# Patient Record
Sex: FEMALE | Race: WHITE | NOT HISPANIC OR LATINO | Employment: OTHER | URBAN - METROPOLITAN AREA
[De-identification: names, ages, dates, MRNs, and addresses within clinical notes are randomized per-mention and may not be internally consistent; named-entity substitution may affect disease eponyms.]

---

## 2021-02-03 DIAGNOSIS — M79.642 PAIN IN BOTH HANDS: Primary | ICD-10-CM

## 2021-02-03 DIAGNOSIS — M79.641 PAIN IN BOTH HANDS: Primary | ICD-10-CM

## 2021-02-05 ENCOUNTER — OFFICE VISIT (OUTPATIENT)
Dept: ORTHOPEDICS | Facility: CLINIC | Age: 51
End: 2021-02-05
Payer: OTHER GOVERNMENT

## 2021-02-05 ENCOUNTER — HOSPITAL ENCOUNTER (OUTPATIENT)
Dept: RADIOLOGY | Facility: HOSPITAL | Age: 51
Discharge: HOME | End: 2021-02-05
Attending: PHYSICIAN ASSISTANT
Payer: OTHER GOVERNMENT

## 2021-02-05 VITALS
WEIGHT: 130 LBS | HEIGHT: 64 IN | DIASTOLIC BLOOD PRESSURE: 85 MMHG | HEART RATE: 90 BPM | SYSTOLIC BLOOD PRESSURE: 124 MMHG | BODY MASS INDEX: 22.2 KG/M2

## 2021-02-05 DIAGNOSIS — R20.2 NUMBNESS AND TINGLING IN LEFT HAND: Primary | ICD-10-CM

## 2021-02-05 DIAGNOSIS — R20.0 NUMBNESS AND TINGLING IN LEFT HAND: Primary | ICD-10-CM

## 2021-02-05 DIAGNOSIS — M79.641 PAIN IN BOTH HANDS: ICD-10-CM

## 2021-02-05 DIAGNOSIS — M79.642 PAIN IN BOTH HANDS: ICD-10-CM

## 2021-02-05 DIAGNOSIS — M65.312 TRIGGER THUMB OF LEFT HAND: ICD-10-CM

## 2021-02-05 DIAGNOSIS — R20.0 NUMBNESS AND TINGLING IN RIGHT HAND: ICD-10-CM

## 2021-02-05 DIAGNOSIS — R20.2 NUMBNESS AND TINGLING IN RIGHT HAND: ICD-10-CM

## 2021-02-05 PROCEDURE — 99203 OFFICE O/P NEW LOW 30 MIN: CPT | Mod: 25,S$PBB,, | Performed by: PHYSICIAN ASSISTANT

## 2021-02-05 PROCEDURE — 99213 OFFICE O/P EST LOW 20 MIN: CPT | Mod: PBBFAC,25 | Performed by: PHYSICIAN ASSISTANT

## 2021-02-05 PROCEDURE — 20550 NJX 1 TENDON SHEATH/LIGAMENT: CPT | Mod: PBBFAC,LT | Performed by: PHYSICIAN ASSISTANT

## 2021-02-05 PROCEDURE — 20550 NJX 1 TENDON SHEATH/LIGAMENT: CPT | Mod: S$PBB,FA,, | Performed by: PHYSICIAN ASSISTANT

## 2021-02-05 PROCEDURE — 99999 PR PBB SHADOW E&M-EST. PATIENT-LVL III: CPT | Mod: PBBFAC,,, | Performed by: PHYSICIAN ASSISTANT

## 2021-02-05 PROCEDURE — 73130 X-RAY EXAM OF HAND: CPT | Mod: TC,50

## 2021-02-05 PROCEDURE — 73130 XR HAND COMPLETE 3 VIEWS BILATERAL: ICD-10-PCS | Mod: 26,50,, | Performed by: RADIOLOGY

## 2021-02-05 PROCEDURE — 99999 PR PBB SHADOW E&M-EST. PATIENT-LVL III: ICD-10-PCS | Mod: PBBFAC,,, | Performed by: PHYSICIAN ASSISTANT

## 2021-02-05 PROCEDURE — 99203 PR OFFICE/OUTPT VISIT, NEW, LEVL III, 30-44 MIN: ICD-10-PCS | Mod: 25,S$PBB,, | Performed by: PHYSICIAN ASSISTANT

## 2021-02-05 PROCEDURE — 20550 TENDON SHEATH: ICD-10-PCS | Mod: S$PBB,FA,, | Performed by: PHYSICIAN ASSISTANT

## 2021-02-05 PROCEDURE — 73130 X-RAY EXAM OF HAND: CPT | Mod: 26,50,, | Performed by: RADIOLOGY

## 2021-02-05 RX ORDER — METHYLPREDNISOLONE ACETATE 40 MG/ML
40 INJECTION, SUSPENSION INTRA-ARTICULAR; INTRALESIONAL; INTRAMUSCULAR; SOFT TISSUE
Status: DISCONTINUED | OUTPATIENT
Start: 2021-02-05 | End: 2021-02-05 | Stop reason: HOSPADM

## 2021-02-05 RX ADMIN — METHYLPREDNISOLONE ACETATE 40 MG: 40 INJECTION, SUSPENSION INTRALESIONAL; INTRAMUSCULAR; INTRASYNOVIAL; SOFT TISSUE at 02:02

## 2021-02-22 ENCOUNTER — HOSPITAL ENCOUNTER (EMERGENCY)
Facility: HOSPITAL | Age: 51
Discharge: HOME | End: 2021-02-22
Attending: EMERGENCY MEDICINE
Payer: OTHER GOVERNMENT

## 2021-02-22 VITALS
TEMPERATURE: 99 F | RESPIRATION RATE: 16 BRPM | SYSTOLIC BLOOD PRESSURE: 140 MMHG | DIASTOLIC BLOOD PRESSURE: 72 MMHG | OXYGEN SATURATION: 96 % | HEART RATE: 97 BPM | WEIGHT: 145.19 LBS | HEIGHT: 64 IN | BODY MASS INDEX: 24.79 KG/M2

## 2021-02-22 DIAGNOSIS — B34.9 VIRAL SYNDROME: Primary | ICD-10-CM

## 2021-02-22 DIAGNOSIS — Z20.822 COVID-19 VIRUS NOT DETECTED: ICD-10-CM

## 2021-02-22 LAB
CTP QC/QA: YES
SARS-COV-2 RDRP RESP QL NAA+PROBE: NEGATIVE

## 2021-02-22 PROCEDURE — U0002 COVID-19 LAB TEST NON-CDC: HCPCS | Performed by: REGISTERED NURSE

## 2021-02-22 PROCEDURE — 99284 EMERGENCY DEPT VISIT MOD MDM: CPT | Mod: 25

## 2021-02-22 RX ORDER — PREDNISONE 20 MG/1
40 TABLET ORAL DAILY
Qty: 10 TABLET | Refills: 0 | Status: SHIPPED | OUTPATIENT
Start: 2021-02-22 | End: 2021-02-27

## 2021-02-22 RX ORDER — BENZONATATE 100 MG/1
100 CAPSULE ORAL 3 TIMES DAILY PRN
Qty: 20 CAPSULE | Refills: 0 | Status: SHIPPED | OUTPATIENT
Start: 2021-02-22 | End: 2021-03-04

## 2021-02-22 RX ORDER — ALBUTEROL SULFATE 90 UG/1
1-2 AEROSOL, METERED RESPIRATORY (INHALATION) EVERY 6 HOURS PRN
Qty: 8 G | Refills: 0 | Status: SHIPPED | OUTPATIENT
Start: 2021-02-22 | End: 2021-04-19

## 2021-03-11 ENCOUNTER — TELEPHONE (OUTPATIENT)
Dept: ORTHOPEDICS | Facility: CLINIC | Age: 51
End: 2021-03-11

## 2021-03-11 ENCOUNTER — OFFICE VISIT (OUTPATIENT)
Dept: PHYSICAL MEDICINE AND REHAB | Facility: CLINIC | Age: 51
End: 2021-03-11
Payer: OTHER GOVERNMENT

## 2021-03-11 VITALS
HEART RATE: 83 BPM | RESPIRATION RATE: 14 BRPM | HEIGHT: 64 IN | SYSTOLIC BLOOD PRESSURE: 127 MMHG | WEIGHT: 145 LBS | BODY MASS INDEX: 24.75 KG/M2 | DIASTOLIC BLOOD PRESSURE: 86 MMHG

## 2021-03-11 DIAGNOSIS — G56.03 BILATERAL CARPAL TUNNEL SYNDROME: ICD-10-CM

## 2021-03-11 PROBLEM — K21.9 ACID REFLUX: Status: ACTIVE | Noted: 2020-01-30

## 2021-03-11 PROBLEM — Z72.0 TOBACCO ABUSE: Status: ACTIVE | Noted: 2020-01-30

## 2021-03-11 PROCEDURE — 95911 NRV CNDJ TEST 9-10 STUDIES: CPT | Mod: PBBFAC | Performed by: PHYSICAL MEDICINE & REHABILITATION

## 2021-03-11 PROCEDURE — 99999 PR PBB SHADOW E&M-EST. PATIENT-LVL III: ICD-10-PCS | Mod: PBBFAC,,, | Performed by: PHYSICAL MEDICINE & REHABILITATION

## 2021-03-11 PROCEDURE — 99999 PR PBB SHADOW E&M-EST. PATIENT-LVL III: CPT | Mod: PBBFAC,,, | Performed by: PHYSICAL MEDICINE & REHABILITATION

## 2021-03-11 PROCEDURE — 99213 OFFICE O/P EST LOW 20 MIN: CPT | Mod: PBBFAC | Performed by: PHYSICAL MEDICINE & REHABILITATION

## 2021-03-11 PROCEDURE — 99204 OFFICE O/P NEW MOD 45 MIN: CPT | Mod: 25,S$PBB,, | Performed by: PHYSICAL MEDICINE & REHABILITATION

## 2021-03-11 PROCEDURE — 99204 PR OFFICE/OUTPT VISIT, NEW, LEVL IV, 45-59 MIN: ICD-10-PCS | Mod: 25,S$PBB,, | Performed by: PHYSICAL MEDICINE & REHABILITATION

## 2021-03-11 PROCEDURE — 95911 PR NERVE CONDUCTION STUDY; 9-10 STUDIES: ICD-10-PCS | Mod: 26,S$PBB,, | Performed by: PHYSICAL MEDICINE & REHABILITATION

## 2021-03-11 PROCEDURE — 95911 NRV CNDJ TEST 9-10 STUDIES: CPT | Mod: 26,S$PBB,, | Performed by: PHYSICAL MEDICINE & REHABILITATION

## 2021-03-11 RX ORDER — POLYETHYLENE GLYCOL 3350 17 G/17G
17 POWDER, FOR SOLUTION ORAL NIGHTLY
COMMUNITY

## 2021-03-11 RX ORDER — FAMOTIDINE 20 MG/1
20 TABLET, FILM COATED ORAL
COMMUNITY
End: 2021-03-18

## 2021-03-15 ENCOUNTER — LAB VISIT (OUTPATIENT)
Dept: LAB | Facility: HOSPITAL | Age: 51
End: 2021-03-15
Attending: NURSE PRACTITIONER
Payer: OTHER GOVERNMENT

## 2021-03-15 ENCOUNTER — OFFICE VISIT (OUTPATIENT)
Dept: OBSTETRICS AND GYNECOLOGY | Facility: CLINIC | Age: 51
End: 2021-03-15
Payer: OTHER GOVERNMENT

## 2021-03-15 VITALS
WEIGHT: 145.94 LBS | SYSTOLIC BLOOD PRESSURE: 114 MMHG | BODY MASS INDEX: 25.05 KG/M2 | DIASTOLIC BLOOD PRESSURE: 78 MMHG

## 2021-03-15 DIAGNOSIS — N95.1 PERIMENOPAUSAL: ICD-10-CM

## 2021-03-15 DIAGNOSIS — N63.10 MASS OF BREAST, RIGHT: ICD-10-CM

## 2021-03-15 DIAGNOSIS — Z12.31 ENCOUNTER FOR SCREENING MAMMOGRAM FOR BREAST CANCER: Primary | ICD-10-CM

## 2021-03-15 DIAGNOSIS — N63.20 MASS OF BREAST, LEFT: ICD-10-CM

## 2021-03-15 DIAGNOSIS — Z12.72 ENCOUNTER FOR PAPANICOLAOU SMEAR OF VAGINA AS PART OF ROUTINE GYNECOLOGICAL EXAMINATION: ICD-10-CM

## 2021-03-15 DIAGNOSIS — Z00.00 PREVENTATIVE HEALTH CARE: ICD-10-CM

## 2021-03-15 DIAGNOSIS — Z01.419 ENCOUNTER FOR PAPANICOLAOU SMEAR OF VAGINA AS PART OF ROUTINE GYNECOLOGICAL EXAMINATION: ICD-10-CM

## 2021-03-15 PROCEDURE — 99999 PR PBB SHADOW E&M-EST. PATIENT-LVL III: ICD-10-PCS | Mod: PBBFAC,,, | Performed by: NURSE PRACTITIONER

## 2021-03-15 PROCEDURE — 99386 PR PREVENTIVE VISIT,NEW,40-64: ICD-10-PCS | Mod: S$PBB,,, | Performed by: NURSE PRACTITIONER

## 2021-03-15 PROCEDURE — 99213 OFFICE O/P EST LOW 20 MIN: CPT | Mod: PBBFAC | Performed by: NURSE PRACTITIONER

## 2021-03-15 PROCEDURE — 88175 CYTOPATH C/V AUTO FLUID REDO: CPT | Performed by: NURSE PRACTITIONER

## 2021-03-15 PROCEDURE — 99999 PR PBB SHADOW E&M-EST. PATIENT-LVL III: CPT | Mod: PBBFAC,,, | Performed by: NURSE PRACTITIONER

## 2021-03-15 PROCEDURE — 87624 HPV HI-RISK TYP POOLED RSLT: CPT | Performed by: NURSE PRACTITIONER

## 2021-03-15 PROCEDURE — 99386 PREV VISIT NEW AGE 40-64: CPT | Mod: S$PBB,,, | Performed by: NURSE PRACTITIONER

## 2021-03-15 PROCEDURE — 36415 COLL VENOUS BLD VENIPUNCTURE: CPT | Performed by: NURSE PRACTITIONER

## 2021-03-15 PROCEDURE — 83001 ASSAY OF GONADOTROPIN (FSH): CPT | Performed by: NURSE PRACTITIONER

## 2021-03-15 RX ORDER — IBUPROFEN 100 MG/5ML
1000 SUSPENSION, ORAL (FINAL DOSE FORM) ORAL DAILY
COMMUNITY
End: 2022-06-18 | Stop reason: ALTCHOICE

## 2021-03-16 LAB — FSH SERPL-ACNC: 12.3 MIU/ML

## 2021-03-17 ENCOUNTER — OFFICE VISIT (OUTPATIENT)
Dept: ORTHOPEDICS | Facility: CLINIC | Age: 51
End: 2021-03-17
Payer: OTHER GOVERNMENT

## 2021-03-17 VITALS
DIASTOLIC BLOOD PRESSURE: 86 MMHG | HEIGHT: 64 IN | SYSTOLIC BLOOD PRESSURE: 132 MMHG | HEART RATE: 83 BPM | WEIGHT: 145.94 LBS | BODY MASS INDEX: 24.92 KG/M2

## 2021-03-17 DIAGNOSIS — G56.03 BILATERAL CARPAL TUNNEL SYNDROME: Primary | ICD-10-CM

## 2021-03-17 DIAGNOSIS — Z01.818 PREOP TESTING: Primary | ICD-10-CM

## 2021-03-17 PROCEDURE — 99213 OFFICE O/P EST LOW 20 MIN: CPT | Mod: S$PBB,,, | Performed by: ORTHOPAEDIC SURGERY

## 2021-03-17 PROCEDURE — 99213 OFFICE O/P EST LOW 20 MIN: CPT | Mod: PBBFAC | Performed by: ORTHOPAEDIC SURGERY

## 2021-03-17 PROCEDURE — 99999 PR PBB SHADOW E&M-EST. PATIENT-LVL III: CPT | Mod: PBBFAC,,, | Performed by: ORTHOPAEDIC SURGERY

## 2021-03-17 PROCEDURE — 99999 PR PBB SHADOW E&M-EST. PATIENT-LVL III: ICD-10-PCS | Mod: PBBFAC,,, | Performed by: ORTHOPAEDIC SURGERY

## 2021-03-17 PROCEDURE — 99213 PR OFFICE/OUTPT VISIT, EST, LEVL III, 20-29 MIN: ICD-10-PCS | Mod: S$PBB,,, | Performed by: ORTHOPAEDIC SURGERY

## 2021-03-18 ENCOUNTER — HOSPITAL ENCOUNTER (OUTPATIENT)
Dept: RADIOLOGY | Facility: HOSPITAL | Age: 51
Discharge: HOME | End: 2021-03-18
Attending: NURSE PRACTITIONER
Payer: OTHER GOVERNMENT

## 2021-03-18 ENCOUNTER — OFFICE VISIT (OUTPATIENT)
Dept: FAMILY MEDICINE | Facility: CLINIC | Age: 51
End: 2021-03-18
Payer: OTHER GOVERNMENT

## 2021-03-18 VITALS
HEART RATE: 94 BPM | OXYGEN SATURATION: 96 % | DIASTOLIC BLOOD PRESSURE: 76 MMHG | SYSTOLIC BLOOD PRESSURE: 118 MMHG | TEMPERATURE: 98 F | WEIGHT: 142.75 LBS | BODY MASS INDEX: 24.37 KG/M2 | HEIGHT: 64 IN

## 2021-03-18 VITALS — HEIGHT: 64 IN | BODY MASS INDEX: 24.92 KG/M2 | WEIGHT: 145.94 LBS

## 2021-03-18 DIAGNOSIS — Z13.6 ENCOUNTER FOR SCREENING FOR CARDIOVASCULAR DISORDERS: ICD-10-CM

## 2021-03-18 DIAGNOSIS — N63.20 MASS OF BREAST, LEFT: ICD-10-CM

## 2021-03-18 DIAGNOSIS — Z01.818 PREOP EXAMINATION: ICD-10-CM

## 2021-03-18 DIAGNOSIS — R05.9 COUGH: ICD-10-CM

## 2021-03-18 DIAGNOSIS — K45.8 RECURRENT ABDOMINAL HERNIA WITHOUT OBSTRUCTION OR GANGRENE, UNSPECIFIED HERNIA TYPE: ICD-10-CM

## 2021-03-18 DIAGNOSIS — K21.9 GASTROESOPHAGEAL REFLUX DISEASE, UNSPECIFIED WHETHER ESOPHAGITIS PRESENT: Primary | ICD-10-CM

## 2021-03-18 DIAGNOSIS — N63.10 MASS OF BREAST, RIGHT: ICD-10-CM

## 2021-03-18 DIAGNOSIS — K64.4 EXTERNAL HEMORRHOID: ICD-10-CM

## 2021-03-18 PROCEDURE — 77066 DX MAMMO INCL CAD BI: CPT | Mod: 26,,, | Performed by: RADIOLOGY

## 2021-03-18 PROCEDURE — 93010 EKG 12-LEAD: ICD-10-PCS | Mod: S$PBB,,, | Performed by: INTERNAL MEDICINE

## 2021-03-18 PROCEDURE — 99999 PR PBB SHADOW E&M-EST. PATIENT-LVL V: CPT | Mod: PBBFAC,,,

## 2021-03-18 PROCEDURE — 76642 US BREAST BILATERAL LIMITED: ICD-10-PCS | Mod: 26,50,, | Performed by: RADIOLOGY

## 2021-03-18 PROCEDURE — 99203 OFFICE O/P NEW LOW 30 MIN: CPT | Mod: S$PBB,,, | Performed by: FAMILY MEDICINE

## 2021-03-18 PROCEDURE — 71046 X-RAY EXAM CHEST 2 VIEWS: CPT | Mod: 26,,, | Performed by: RADIOLOGY

## 2021-03-18 PROCEDURE — 71046 X-RAY EXAM CHEST 2 VIEWS: CPT | Mod: TC,FY,PO

## 2021-03-18 PROCEDURE — 71046 XR CHEST PA AND LATERAL: ICD-10-PCS | Mod: 26,,, | Performed by: RADIOLOGY

## 2021-03-18 PROCEDURE — 77062 MAMMO DIGITAL DIAGNOSTIC BILAT WITH TOMO: ICD-10-PCS | Mod: 26,,, | Performed by: RADIOLOGY

## 2021-03-18 PROCEDURE — 77066 MAMMO DIGITAL DIAGNOSTIC BILAT WITH TOMO: ICD-10-PCS | Mod: 26,,, | Performed by: RADIOLOGY

## 2021-03-18 PROCEDURE — 77062 BREAST TOMOSYNTHESIS BI: CPT | Mod: 26,,, | Performed by: RADIOLOGY

## 2021-03-18 PROCEDURE — 76642 ULTRASOUND BREAST LIMITED: CPT | Mod: TC,50

## 2021-03-18 PROCEDURE — 99999 PR PBB SHADOW E&M-EST. PATIENT-LVL V: ICD-10-PCS | Mod: PBBFAC,,,

## 2021-03-18 PROCEDURE — 93010 ELECTROCARDIOGRAM REPORT: CPT | Mod: S$PBB,,, | Performed by: INTERNAL MEDICINE

## 2021-03-18 PROCEDURE — 99203 PR OFFICE/OUTPT VISIT, NEW, LEVL III, 30-44 MIN: ICD-10-PCS | Mod: S$PBB,,, | Performed by: FAMILY MEDICINE

## 2021-03-18 PROCEDURE — 77066 DX MAMMO INCL CAD BI: CPT | Mod: TC

## 2021-03-18 PROCEDURE — 99215 OFFICE O/P EST HI 40 MIN: CPT | Mod: PBBFAC,25,PO | Performed by: NURSE PRACTITIONER

## 2021-03-18 PROCEDURE — 93005 ELECTROCARDIOGRAM TRACING: CPT | Mod: PBBFAC,PO | Performed by: INTERNAL MEDICINE

## 2021-03-18 PROCEDURE — 76642 ULTRASOUND BREAST LIMITED: CPT | Mod: 26,50,, | Performed by: RADIOLOGY

## 2021-03-18 RX ORDER — OMEPRAZOLE 40 MG/1
40 CAPSULE, DELAYED RELEASE ORAL DAILY
Qty: 30 CAPSULE | Refills: 3 | Status: SHIPPED | OUTPATIENT
Start: 2021-03-18 | End: 2021-07-19 | Stop reason: SDUPTHER

## 2021-03-19 LAB
HPV HR 12 DNA SPEC QL NAA+PROBE: NEGATIVE
HPV16 AG SPEC QL: NEGATIVE
HPV18 DNA SPEC QL NAA+PROBE: NEGATIVE

## 2021-03-23 LAB
FINAL PATHOLOGIC DIAGNOSIS: NORMAL
Lab: NORMAL

## 2021-03-24 ENCOUNTER — TELEPHONE (OUTPATIENT)
Dept: PREADMISSION TESTING | Facility: HOSPITAL | Age: 51
End: 2021-03-24

## 2021-03-29 ENCOUNTER — TELEPHONE (OUTPATIENT)
Dept: PREADMISSION TESTING | Facility: HOSPITAL | Age: 51
End: 2021-03-29

## 2021-04-01 ENCOUNTER — TELEPHONE (OUTPATIENT)
Dept: PREADMISSION TESTING | Facility: HOSPITAL | Age: 51
End: 2021-04-01

## 2021-04-01 ENCOUNTER — ANESTHESIA EVENT (OUTPATIENT)
Dept: SURGERY | Facility: HOSPITAL | Age: 51
End: 2021-04-01
Payer: OTHER GOVERNMENT

## 2021-04-02 ENCOUNTER — LAB VISIT (OUTPATIENT)
Dept: URGENT CARE | Facility: CLINIC | Age: 51
End: 2021-04-02
Payer: OTHER GOVERNMENT

## 2021-04-02 DIAGNOSIS — Z01.818 PREOP TESTING: ICD-10-CM

## 2021-04-02 PROCEDURE — U0003 INFECTIOUS AGENT DETECTION BY NUCLEIC ACID (DNA OR RNA); SEVERE ACUTE RESPIRATORY SYNDROME CORONAVIRUS 2 (SARS-COV-2) (CORONAVIRUS DISEASE [COVID-19]), AMPLIFIED PROBE TECHNIQUE, MAKING USE OF HIGH THROUGHPUT TECHNOLOGIES AS DESCRIBED BY CMS-2020-01-R: HCPCS | Performed by: ORTHOPAEDIC SURGERY

## 2021-04-02 PROCEDURE — U0005 INFEC AGEN DETEC AMPLI PROBE: HCPCS | Performed by: ORTHOPAEDIC SURGERY

## 2021-04-03 LAB — SARS-COV-2 RNA RESP QL NAA+PROBE: NOT DETECTED

## 2021-04-05 ENCOUNTER — HOSPITAL ENCOUNTER (OUTPATIENT)
Facility: HOSPITAL | Age: 51
Discharge: HOME | End: 2021-04-05
Attending: ORTHOPAEDIC SURGERY | Admitting: ORTHOPAEDIC SURGERY
Payer: OTHER GOVERNMENT

## 2021-04-05 ENCOUNTER — TELEPHONE (OUTPATIENT)
Dept: URGENT CARE | Facility: CLINIC | Age: 51
End: 2021-04-05

## 2021-04-05 ENCOUNTER — ANESTHESIA (OUTPATIENT)
Dept: SURGERY | Facility: HOSPITAL | Age: 51
End: 2021-04-05
Payer: OTHER GOVERNMENT

## 2021-04-05 VITALS
HEIGHT: 64 IN | OXYGEN SATURATION: 98 % | HEART RATE: 74 BPM | DIASTOLIC BLOOD PRESSURE: 68 MMHG | RESPIRATION RATE: 18 BRPM | SYSTOLIC BLOOD PRESSURE: 120 MMHG | BODY MASS INDEX: 24.62 KG/M2 | TEMPERATURE: 98 F | WEIGHT: 144.19 LBS

## 2021-04-05 DIAGNOSIS — G56.03 BILATERAL CARPAL TUNNEL SYNDROME: ICD-10-CM

## 2021-04-05 DIAGNOSIS — G56.01 CARPAL TUNNEL SYNDROME ON RIGHT: Primary | ICD-10-CM

## 2021-04-05 LAB
B-HCG UR QL: NEGATIVE
CTP QC/QA: YES

## 2021-04-05 PROCEDURE — 64721 PR REVISE MEDIAN N/CARPAL TUNNEL SURG: ICD-10-PCS | Mod: RT,,, | Performed by: ORTHOPAEDIC SURGERY

## 2021-04-05 PROCEDURE — 37000009 HC ANESTHESIA EA ADD 15 MINS: Performed by: ORTHOPAEDIC SURGERY

## 2021-04-05 PROCEDURE — D9220A PRA ANESTHESIA: ICD-10-PCS | Mod: ANES,,, | Performed by: ANESTHESIOLOGY

## 2021-04-05 PROCEDURE — 36000707: Performed by: ORTHOPAEDIC SURGERY

## 2021-04-05 PROCEDURE — 64721 CARPAL TUNNEL SURGERY: CPT | Mod: RT,,, | Performed by: ORTHOPAEDIC SURGERY

## 2021-04-05 PROCEDURE — 63600175 PHARM REV CODE 636 W HCPCS: Performed by: NURSE ANESTHETIST, CERTIFIED REGISTERED

## 2021-04-05 PROCEDURE — 37000008 HC ANESTHESIA 1ST 15 MINUTES: Performed by: ORTHOPAEDIC SURGERY

## 2021-04-05 PROCEDURE — D9220A PRA ANESTHESIA: Mod: CRNA,,, | Performed by: NURSE ANESTHETIST, CERTIFIED REGISTERED

## 2021-04-05 PROCEDURE — D9220A PRA ANESTHESIA: Mod: ANES,,, | Performed by: ANESTHESIOLOGY

## 2021-04-05 PROCEDURE — 81025 URINE PREGNANCY TEST: CPT | Performed by: ORTHOPAEDIC SURGERY

## 2021-04-05 PROCEDURE — D9220A PRA ANESTHESIA: ICD-10-PCS | Mod: CRNA,,, | Performed by: NURSE ANESTHETIST, CERTIFIED REGISTERED

## 2021-04-05 PROCEDURE — 36000706: Performed by: ORTHOPAEDIC SURGERY

## 2021-04-05 PROCEDURE — 63600175 PHARM REV CODE 636 W HCPCS: Performed by: PHYSICIAN ASSISTANT

## 2021-04-05 PROCEDURE — 25000003 PHARM REV CODE 250: Performed by: ORTHOPAEDIC SURGERY

## 2021-04-05 PROCEDURE — 71000033 HC RECOVERY, INTIAL HOUR: Performed by: ORTHOPAEDIC SURGERY

## 2021-04-05 PROCEDURE — 63600175 PHARM REV CODE 636 W HCPCS: Performed by: ANESTHESIOLOGY

## 2021-04-05 RX ORDER — DIPHENHYDRAMINE HYDROCHLORIDE 50 MG/ML
25 INJECTION INTRAMUSCULAR; INTRAVENOUS EVERY 6 HOURS PRN
Status: DISCONTINUED | OUTPATIENT
Start: 2021-04-05 | End: 2021-04-05 | Stop reason: HOSPADM

## 2021-04-05 RX ORDER — CHLORHEXIDINE GLUCONATE ORAL RINSE 1.2 MG/ML
10 SOLUTION DENTAL 2 TIMES DAILY
Status: DISCONTINUED | OUTPATIENT
Start: 2021-04-05 | End: 2021-04-05 | Stop reason: HOSPADM

## 2021-04-05 RX ORDER — LIDOCAINE HYDROCHLORIDE 20 MG/ML
INJECTION, SOLUTION INFILTRATION; PERINEURAL
Status: DISCONTINUED
Start: 2021-04-05 | End: 2021-04-05 | Stop reason: HOSPADM

## 2021-04-05 RX ORDER — MIDAZOLAM HYDROCHLORIDE 1 MG/ML
INJECTION INTRAMUSCULAR; INTRAVENOUS
Status: DISCONTINUED | OUTPATIENT
Start: 2021-04-05 | End: 2021-04-05

## 2021-04-05 RX ORDER — ONDANSETRON 2 MG/ML
4 INJECTION INTRAMUSCULAR; INTRAVENOUS ONCE AS NEEDED
Status: DISCONTINUED | OUTPATIENT
Start: 2021-04-05 | End: 2021-04-05 | Stop reason: HOSPADM

## 2021-04-05 RX ORDER — ALBUTEROL SULFATE 0.83 MG/ML
2.5 SOLUTION RESPIRATORY (INHALATION) EVERY 4 HOURS PRN
Status: DISCONTINUED | OUTPATIENT
Start: 2021-04-05 | End: 2021-04-05 | Stop reason: HOSPADM

## 2021-04-05 RX ORDER — HYDROCODONE BITARTRATE AND ACETAMINOPHEN 5; 325 MG/1; MG/1
1 TABLET ORAL EVERY 6 HOURS PRN
Qty: 15 TABLET | Refills: 0 | Status: SHIPPED | OUTPATIENT
Start: 2021-04-05 | End: 2021-04-09 | Stop reason: SDUPTHER

## 2021-04-05 RX ORDER — SODIUM CHLORIDE, SODIUM LACTATE, POTASSIUM CHLORIDE, CALCIUM CHLORIDE 600; 310; 30; 20 MG/100ML; MG/100ML; MG/100ML; MG/100ML
INJECTION, SOLUTION INTRAVENOUS CONTINUOUS
Status: ACTIVE | OUTPATIENT
Start: 2021-04-05

## 2021-04-05 RX ORDER — PROPOFOL 10 MG/ML
INJECTION, EMULSION INTRAVENOUS CONTINUOUS PRN
Status: DISCONTINUED | OUTPATIENT
Start: 2021-04-05 | End: 2021-04-05

## 2021-04-05 RX ORDER — FENTANYL CITRATE 50 UG/ML
25 INJECTION, SOLUTION INTRAMUSCULAR; INTRAVENOUS EVERY 5 MIN PRN
Status: DISCONTINUED | OUTPATIENT
Start: 2021-04-05 | End: 2021-04-05 | Stop reason: HOSPADM

## 2021-04-05 RX ORDER — FENTANYL CITRATE 50 UG/ML
INJECTION, SOLUTION INTRAMUSCULAR; INTRAVENOUS
Status: DISCONTINUED | OUTPATIENT
Start: 2021-04-05 | End: 2021-04-05

## 2021-04-05 RX ORDER — HYDROCODONE BITARTRATE AND ACETAMINOPHEN 5; 325 MG/1; MG/1
1 TABLET ORAL
Status: DISCONTINUED | OUTPATIENT
Start: 2021-04-05 | End: 2021-04-05 | Stop reason: HOSPADM

## 2021-04-05 RX ORDER — MEPERIDINE HYDROCHLORIDE 25 MG/ML
12.5 INJECTION INTRAMUSCULAR; INTRAVENOUS; SUBCUTANEOUS ONCE
Status: DISCONTINUED | OUTPATIENT
Start: 2021-04-05 | End: 2021-04-05 | Stop reason: HOSPADM

## 2021-04-05 RX ORDER — LIDOCAINE HCL/PF 100 MG/5ML
SYRINGE (ML) INTRAVENOUS
Status: DISCONTINUED | OUTPATIENT
Start: 2021-04-05 | End: 2021-04-05

## 2021-04-05 RX ORDER — CEFAZOLIN SODIUM 2 G/50ML
2 SOLUTION INTRAVENOUS
Status: COMPLETED | OUTPATIENT
Start: 2021-04-05 | End: 2021-04-05

## 2021-04-05 RX ORDER — HYDROCODONE BITARTRATE AND ACETAMINOPHEN 5; 325 MG/1; MG/1
1 TABLET ORAL EVERY 4 HOURS PRN
Status: DISCONTINUED | OUTPATIENT
Start: 2021-04-05 | End: 2021-04-05 | Stop reason: HOSPADM

## 2021-04-05 RX ORDER — SODIUM CHLORIDE, SODIUM LACTATE, POTASSIUM CHLORIDE, CALCIUM CHLORIDE 600; 310; 30; 20 MG/100ML; MG/100ML; MG/100ML; MG/100ML
INJECTION, SOLUTION INTRAVENOUS
Status: ACTIVE | OUTPATIENT
Start: 2021-04-05

## 2021-04-05 RX ORDER — LIDOCAINE HYDROCHLORIDE 20 MG/ML
INJECTION, SOLUTION EPIDURAL; INFILTRATION; INTRACAUDAL; PERINEURAL
Status: DISCONTINUED | OUTPATIENT
Start: 2021-04-05 | End: 2021-04-05 | Stop reason: HOSPADM

## 2021-04-05 RX ADMIN — Medication 100 MG: at 11:04

## 2021-04-05 RX ADMIN — CEFAZOLIN SODIUM 2 G: 2 SOLUTION INTRAVENOUS at 11:04

## 2021-04-05 RX ADMIN — SODIUM CHLORIDE, SODIUM LACTATE, POTASSIUM CHLORIDE, AND CALCIUM CHLORIDE: .6; .31; .03; .02 INJECTION, SOLUTION INTRAVENOUS at 08:04

## 2021-04-05 RX ADMIN — FENTANYL CITRATE 100 MCG: 50 INJECTION, SOLUTION INTRAMUSCULAR; INTRAVENOUS at 11:04

## 2021-04-05 RX ADMIN — MIDAZOLAM HYDROCHLORIDE 2 MG: 1 INJECTION INTRAMUSCULAR; INTRAVENOUS at 11:04

## 2021-04-05 RX ADMIN — PROPOFOL 150 MCG/KG/MIN: 10 INJECTION, EMULSION INTRAVENOUS at 11:04

## 2021-04-09 ENCOUNTER — OFFICE VISIT (OUTPATIENT)
Dept: ORTHOPEDICS | Facility: CLINIC | Age: 51
End: 2021-04-09
Payer: OTHER GOVERNMENT

## 2021-04-09 VITALS
HEIGHT: 64 IN | HEART RATE: 82 BPM | TEMPERATURE: 98 F | SYSTOLIC BLOOD PRESSURE: 122 MMHG | DIASTOLIC BLOOD PRESSURE: 84 MMHG | WEIGHT: 144 LBS | BODY MASS INDEX: 24.59 KG/M2

## 2021-04-09 DIAGNOSIS — G56.01 CARPAL TUNNEL SYNDROME OF RIGHT WRIST: Primary | ICD-10-CM

## 2021-04-09 PROCEDURE — 99999 PR PBB SHADOW E&M-EST. PATIENT-LVL III: ICD-10-PCS | Mod: PBBFAC,,, | Performed by: PHYSICIAN ASSISTANT

## 2021-04-09 PROCEDURE — 99999 PR PBB SHADOW E&M-EST. PATIENT-LVL III: CPT | Mod: PBBFAC,,, | Performed by: PHYSICIAN ASSISTANT

## 2021-04-09 PROCEDURE — 99024 POSTOP FOLLOW-UP VISIT: CPT | Mod: ,,, | Performed by: PHYSICIAN ASSISTANT

## 2021-04-09 PROCEDURE — 99213 OFFICE O/P EST LOW 20 MIN: CPT | Mod: PBBFAC | Performed by: PHYSICIAN ASSISTANT

## 2021-04-09 PROCEDURE — 99024 PR POST-OP FOLLOW-UP VISIT: ICD-10-PCS | Mod: ,,, | Performed by: PHYSICIAN ASSISTANT

## 2021-04-09 RX ORDER — HYDROCODONE BITARTRATE AND ACETAMINOPHEN 5; 325 MG/1; MG/1
1 TABLET ORAL EVERY 8 HOURS PRN
Qty: 21 TABLET | Refills: 0 | Status: SHIPPED | OUTPATIENT
Start: 2021-04-09 | End: 2021-04-19

## 2021-04-09 RX ORDER — ONDANSETRON 4 MG/1
4 TABLET, FILM COATED ORAL EVERY 8 HOURS PRN
Qty: 15 TABLET | Refills: 0 | Status: SHIPPED | OUTPATIENT
Start: 2021-04-09 | End: 2021-04-19

## 2021-04-19 ENCOUNTER — OFFICE VISIT (OUTPATIENT)
Dept: ORTHOPEDICS | Facility: CLINIC | Age: 51
End: 2021-04-19
Payer: OTHER GOVERNMENT

## 2021-04-19 VITALS
HEART RATE: 80 BPM | TEMPERATURE: 98 F | DIASTOLIC BLOOD PRESSURE: 83 MMHG | BODY MASS INDEX: 24.57 KG/M2 | HEIGHT: 64 IN | WEIGHT: 143.94 LBS | SYSTOLIC BLOOD PRESSURE: 119 MMHG

## 2021-04-19 DIAGNOSIS — G56.03 BILATERAL CARPAL TUNNEL SYNDROME: ICD-10-CM

## 2021-04-19 DIAGNOSIS — G56.01 CARPAL TUNNEL SYNDROME OF RIGHT WRIST: Primary | ICD-10-CM

## 2021-04-19 PROCEDURE — 99999 PR PBB SHADOW E&M-EST. PATIENT-LVL III: CPT | Mod: PBBFAC,,, | Performed by: PHYSICIAN ASSISTANT

## 2021-04-19 PROCEDURE — 99024 POSTOP FOLLOW-UP VISIT: CPT | Mod: ,,, | Performed by: PHYSICIAN ASSISTANT

## 2021-04-19 PROCEDURE — 99213 OFFICE O/P EST LOW 20 MIN: CPT | Mod: PBBFAC | Performed by: PHYSICIAN ASSISTANT

## 2021-04-19 PROCEDURE — 99024 PR POST-OP FOLLOW-UP VISIT: ICD-10-PCS | Mod: ,,, | Performed by: PHYSICIAN ASSISTANT

## 2021-04-19 PROCEDURE — 99999 PR PBB SHADOW E&M-EST. PATIENT-LVL III: ICD-10-PCS | Mod: PBBFAC,,, | Performed by: PHYSICIAN ASSISTANT

## 2021-05-10 ENCOUNTER — PATIENT MESSAGE (OUTPATIENT)
Dept: RESEARCH | Facility: HOSPITAL | Age: 51
End: 2021-05-10

## 2021-05-10 ENCOUNTER — OFFICE VISIT (OUTPATIENT)
Dept: SURGERY | Facility: CLINIC | Age: 51
End: 2021-05-10
Payer: OTHER GOVERNMENT

## 2021-05-10 ENCOUNTER — LAB VISIT (OUTPATIENT)
Dept: LAB | Facility: HOSPITAL | Age: 51
End: 2021-05-10
Attending: COLON & RECTAL SURGERY
Payer: OTHER GOVERNMENT

## 2021-05-10 ENCOUNTER — PATIENT MESSAGE (OUTPATIENT)
Dept: ENDOSCOPY | Facility: HOSPITAL | Age: 51
End: 2021-05-10

## 2021-05-10 VITALS
WEIGHT: 145.5 LBS | BODY MASS INDEX: 24.98 KG/M2 | SYSTOLIC BLOOD PRESSURE: 127 MMHG | HEART RATE: 81 BPM | DIASTOLIC BLOOD PRESSURE: 91 MMHG

## 2021-05-10 DIAGNOSIS — R10.12 LEFT UPPER QUADRANT ABDOMINAL PAIN: ICD-10-CM

## 2021-05-10 DIAGNOSIS — Z01.818 PRE-OP TESTING: Primary | ICD-10-CM

## 2021-05-10 DIAGNOSIS — K92.1 HEMATOCHEZIA: ICD-10-CM

## 2021-05-10 DIAGNOSIS — Z12.11 SCREEN FOR COLON CANCER: ICD-10-CM

## 2021-05-10 DIAGNOSIS — K64.4 EXTERNAL HEMORRHOID: ICD-10-CM

## 2021-05-10 DIAGNOSIS — K64.8 INTERNAL HEMORRHOID: Primary | ICD-10-CM

## 2021-05-10 DIAGNOSIS — K45.8 RECURRENT ABDOMINAL HERNIA WITHOUT OBSTRUCTION OR GANGRENE, UNSPECIFIED HERNIA TYPE: ICD-10-CM

## 2021-05-10 DIAGNOSIS — K62.5 ANAL BLEEDING: ICD-10-CM

## 2021-05-10 LAB
CREAT SERPL-MCNC: 0.7 MG/DL (ref 0.5–1.4)
EST. GFR  (AFRICAN AMERICAN): >60 ML/MIN/1.73 M^2
EST. GFR  (NON AFRICAN AMERICAN): >60 ML/MIN/1.73 M^2

## 2021-05-10 PROCEDURE — 46600 DIAGNOSTIC ANOSCOPY SPX: CPT | Mod: S$PBB,,, | Performed by: COLON & RECTAL SURGERY

## 2021-05-10 PROCEDURE — 36415 COLL VENOUS BLD VENIPUNCTURE: CPT | Performed by: COLON & RECTAL SURGERY

## 2021-05-10 PROCEDURE — 99213 OFFICE O/P EST LOW 20 MIN: CPT | Mod: PBBFAC | Performed by: COLON & RECTAL SURGERY

## 2021-05-10 PROCEDURE — 99999 PR PBB SHADOW E&M-EST. PATIENT-LVL III: CPT | Mod: PBBFAC,,, | Performed by: COLON & RECTAL SURGERY

## 2021-05-10 PROCEDURE — 46600 DIAGNOSTIC ANOSCOPY SPX: CPT | Mod: PBBFAC | Performed by: COLON & RECTAL SURGERY

## 2021-05-10 PROCEDURE — 99204 OFFICE O/P NEW MOD 45 MIN: CPT | Mod: 25,S$PBB,, | Performed by: COLON & RECTAL SURGERY

## 2021-05-10 PROCEDURE — 82565 ASSAY OF CREATININE: CPT | Performed by: COLON & RECTAL SURGERY

## 2021-05-10 PROCEDURE — 99999 PR PBB SHADOW E&M-EST. PATIENT-LVL III: ICD-10-PCS | Mod: PBBFAC,,, | Performed by: COLON & RECTAL SURGERY

## 2021-05-10 PROCEDURE — 46600 PR DIAG2STIC A2SCOPY: ICD-10-PCS | Mod: S$PBB,,, | Performed by: COLON & RECTAL SURGERY

## 2021-05-10 PROCEDURE — 99204 PR OFFICE/OUTPT VISIT, NEW, LEVL IV, 45-59 MIN: ICD-10-PCS | Mod: 25,S$PBB,, | Performed by: COLON & RECTAL SURGERY

## 2021-05-10 RX ORDER — SODIUM, POTASSIUM,MAG SULFATES 17.5-3.13G
1 SOLUTION, RECONSTITUTED, ORAL ORAL DAILY
Qty: 1 KIT | Refills: 0 | Status: SHIPPED | OUTPATIENT
Start: 2021-05-10 | End: 2021-05-12

## 2021-05-11 ENCOUNTER — OFFICE VISIT (OUTPATIENT)
Dept: ORTHOPEDICS | Facility: CLINIC | Age: 51
End: 2021-05-11
Payer: OTHER GOVERNMENT

## 2021-05-11 VITALS
SYSTOLIC BLOOD PRESSURE: 133 MMHG | HEART RATE: 69 BPM | DIASTOLIC BLOOD PRESSURE: 94 MMHG | BODY MASS INDEX: 24.75 KG/M2 | WEIGHT: 145 LBS | HEIGHT: 64 IN

## 2021-05-11 DIAGNOSIS — G56.01 CARPAL TUNNEL SYNDROME OF RIGHT WRIST: Primary | ICD-10-CM

## 2021-05-11 PROCEDURE — 99999 PR PBB SHADOW E&M-EST. PATIENT-LVL III: ICD-10-PCS | Mod: PBBFAC,,, | Performed by: ORTHOPAEDIC SURGERY

## 2021-05-11 PROCEDURE — 99999 PR PBB SHADOW E&M-EST. PATIENT-LVL III: CPT | Mod: PBBFAC,,, | Performed by: ORTHOPAEDIC SURGERY

## 2021-05-11 PROCEDURE — 99213 OFFICE O/P EST LOW 20 MIN: CPT | Mod: PBBFAC | Performed by: ORTHOPAEDIC SURGERY

## 2021-05-11 PROCEDURE — 99024 POSTOP FOLLOW-UP VISIT: CPT | Mod: ,,, | Performed by: ORTHOPAEDIC SURGERY

## 2021-05-11 PROCEDURE — 99024 PR POST-OP FOLLOW-UP VISIT: ICD-10-PCS | Mod: ,,, | Performed by: ORTHOPAEDIC SURGERY

## 2021-05-16 ENCOUNTER — LAB VISIT (OUTPATIENT)
Dept: OTOLARYNGOLOGY | Facility: CLINIC | Age: 51
End: 2021-05-16
Payer: OTHER GOVERNMENT

## 2021-05-16 DIAGNOSIS — Z01.818 PRE-OP TESTING: ICD-10-CM

## 2021-05-16 PROCEDURE — U0003 INFECTIOUS AGENT DETECTION BY NUCLEIC ACID (DNA OR RNA); SEVERE ACUTE RESPIRATORY SYNDROME CORONAVIRUS 2 (SARS-COV-2) (CORONAVIRUS DISEASE [COVID-19]), AMPLIFIED PROBE TECHNIQUE, MAKING USE OF HIGH THROUGHPUT TECHNOLOGIES AS DESCRIBED BY CMS-2020-01-R: HCPCS | Performed by: COLON & RECTAL SURGERY

## 2021-05-16 PROCEDURE — U0005 INFEC AGEN DETEC AMPLI PROBE: HCPCS | Performed by: COLON & RECTAL SURGERY

## 2021-05-17 ENCOUNTER — HOSPITAL ENCOUNTER (OUTPATIENT)
Dept: RADIOLOGY | Facility: HOSPITAL | Age: 51
Discharge: HOME | End: 2021-05-17
Attending: COLON & RECTAL SURGERY
Payer: OTHER GOVERNMENT

## 2021-05-17 DIAGNOSIS — K45.8 RECURRENT ABDOMINAL HERNIA WITHOUT OBSTRUCTION OR GANGRENE, UNSPECIFIED HERNIA TYPE: ICD-10-CM

## 2021-05-17 DIAGNOSIS — R10.12 LEFT UPPER QUADRANT ABDOMINAL PAIN: ICD-10-CM

## 2021-05-17 LAB — SARS-COV-2 RNA RESP QL NAA+PROBE: NOT DETECTED

## 2021-05-17 PROCEDURE — 74177 CT ABD & PELVIS W/CONTRAST: CPT | Mod: TC

## 2021-05-17 PROCEDURE — 25500020 PHARM REV CODE 255: Performed by: COLON & RECTAL SURGERY

## 2021-05-17 RX ADMIN — IOHEXOL 75 ML: 350 INJECTION, SOLUTION INTRAVENOUS at 02:05

## 2021-05-19 ENCOUNTER — ANESTHESIA EVENT (OUTPATIENT)
Dept: ENDOSCOPY | Facility: HOSPITAL | Age: 51
End: 2021-05-19
Payer: OTHER GOVERNMENT

## 2021-05-19 ENCOUNTER — ANESTHESIA (OUTPATIENT)
Dept: ENDOSCOPY | Facility: HOSPITAL | Age: 51
End: 2021-05-19
Payer: OTHER GOVERNMENT

## 2021-05-19 ENCOUNTER — HOSPITAL ENCOUNTER (OUTPATIENT)
Facility: HOSPITAL | Age: 51
Discharge: HOME | End: 2021-05-19
Attending: COLON & RECTAL SURGERY | Admitting: COLON & RECTAL SURGERY
Payer: OTHER GOVERNMENT

## 2021-05-19 DIAGNOSIS — Z12.11 SCREEN FOR COLON CANCER: Primary | ICD-10-CM

## 2021-05-19 LAB
B-HCG UR QL: NEGATIVE
CTP QC/QA: YES

## 2021-05-19 PROCEDURE — 45380 COLONOSCOPY AND BIOPSY: CPT | Mod: 33,,, | Performed by: COLON & RECTAL SURGERY

## 2021-05-19 PROCEDURE — 88305 TISSUE EXAM BY PATHOLOGIST: ICD-10-PCS | Mod: 26,,, | Performed by: STUDENT IN AN ORGANIZED HEALTH CARE EDUCATION/TRAINING PROGRAM

## 2021-05-19 PROCEDURE — 81025 URINE PREGNANCY TEST: CPT | Performed by: COLON & RECTAL SURGERY

## 2021-05-19 PROCEDURE — 37000008 HC ANESTHESIA 1ST 15 MINUTES: Performed by: COLON & RECTAL SURGERY

## 2021-05-19 PROCEDURE — 45380 COLONOSCOPY AND BIOPSY: CPT | Performed by: COLON & RECTAL SURGERY

## 2021-05-19 PROCEDURE — 88305 TISSUE EXAM BY PATHOLOGIST: CPT | Performed by: STUDENT IN AN ORGANIZED HEALTH CARE EDUCATION/TRAINING PROGRAM

## 2021-05-19 PROCEDURE — 27201012 HC FORCEPS, HOT/COLD, DISP: Performed by: COLON & RECTAL SURGERY

## 2021-05-19 PROCEDURE — 88305 TISSUE EXAM BY PATHOLOGIST: CPT | Mod: 26,,, | Performed by: STUDENT IN AN ORGANIZED HEALTH CARE EDUCATION/TRAINING PROGRAM

## 2021-05-19 PROCEDURE — 25000003 PHARM REV CODE 250: Performed by: NURSE ANESTHETIST, CERTIFIED REGISTERED

## 2021-05-19 PROCEDURE — 63600175 PHARM REV CODE 636 W HCPCS: Performed by: NURSE ANESTHETIST, CERTIFIED REGISTERED

## 2021-05-19 PROCEDURE — 37000009 HC ANESTHESIA EA ADD 15 MINS: Performed by: COLON & RECTAL SURGERY

## 2021-05-19 PROCEDURE — 45380 PR COLONOSCOPY,BIOPSY: ICD-10-PCS | Mod: 33,,, | Performed by: COLON & RECTAL SURGERY

## 2021-05-19 RX ORDER — LIDOCAINE HYDROCHLORIDE 10 MG/ML
INJECTION, SOLUTION EPIDURAL; INFILTRATION; INTRACAUDAL; PERINEURAL
Status: DISCONTINUED | OUTPATIENT
Start: 2021-05-19 | End: 2021-05-19

## 2021-05-19 RX ORDER — SODIUM CHLORIDE, SODIUM LACTATE, POTASSIUM CHLORIDE, CALCIUM CHLORIDE 600; 310; 30; 20 MG/100ML; MG/100ML; MG/100ML; MG/100ML
INJECTION, SOLUTION INTRAVENOUS CONTINUOUS PRN
Status: DISCONTINUED | OUTPATIENT
Start: 2021-05-19 | End: 2021-05-19

## 2021-05-19 RX ORDER — ONDANSETRON 2 MG/ML
INJECTION INTRAMUSCULAR; INTRAVENOUS
Status: DISCONTINUED | OUTPATIENT
Start: 2021-05-19 | End: 2021-05-19

## 2021-05-19 RX ORDER — PROPOFOL 10 MG/ML
VIAL (ML) INTRAVENOUS
Status: DISCONTINUED | OUTPATIENT
Start: 2021-05-19 | End: 2021-05-19

## 2021-05-19 RX ADMIN — PROPOFOL 30 MG: 10 INJECTION, EMULSION INTRAVENOUS at 11:05

## 2021-05-19 RX ADMIN — LIDOCAINE HYDROCHLORIDE 50 MG: 10 INJECTION, SOLUTION EPIDURAL; INFILTRATION; INTRACAUDAL; PERINEURAL at 11:05

## 2021-05-19 RX ADMIN — PROPOFOL 40 MG: 10 INJECTION, EMULSION INTRAVENOUS at 11:05

## 2021-05-19 RX ADMIN — PROPOFOL 100 MG: 10 INJECTION, EMULSION INTRAVENOUS at 11:05

## 2021-05-19 RX ADMIN — ONDANSETRON 4 MG: 2 INJECTION, SOLUTION INTRAMUSCULAR; INTRAVENOUS at 11:05

## 2021-05-19 RX ADMIN — SODIUM CHLORIDE, SODIUM LACTATE, POTASSIUM CHLORIDE, AND CALCIUM CHLORIDE: .6; .31; .03; .02 INJECTION, SOLUTION INTRAVENOUS at 11:05

## 2021-05-20 VITALS
TEMPERATURE: 99 F | WEIGHT: 138.88 LBS | SYSTOLIC BLOOD PRESSURE: 122 MMHG | HEART RATE: 78 BPM | HEIGHT: 64 IN | RESPIRATION RATE: 18 BRPM | OXYGEN SATURATION: 98 % | DIASTOLIC BLOOD PRESSURE: 78 MMHG | BODY MASS INDEX: 23.71 KG/M2

## 2021-05-24 LAB
FINAL PATHOLOGIC DIAGNOSIS: NORMAL
GROSS: NORMAL
Lab: NORMAL

## 2021-07-19 DIAGNOSIS — K21.9 GASTROESOPHAGEAL REFLUX DISEASE, UNSPECIFIED WHETHER ESOPHAGITIS PRESENT: Primary | ICD-10-CM

## 2021-07-19 RX ORDER — OMEPRAZOLE 40 MG/1
40 CAPSULE, DELAYED RELEASE ORAL DAILY
Qty: 30 CAPSULE | Refills: 6 | Status: SHIPPED | OUTPATIENT
Start: 2021-07-19 | End: 2022-02-16 | Stop reason: SDUPTHER

## 2021-07-28 ENCOUNTER — OFFICE VISIT (OUTPATIENT)
Dept: ORTHOPEDICS | Facility: CLINIC | Age: 51
End: 2021-07-28
Payer: OTHER GOVERNMENT

## 2021-07-28 VITALS
WEIGHT: 138.88 LBS | BODY MASS INDEX: 23.71 KG/M2 | SYSTOLIC BLOOD PRESSURE: 121 MMHG | HEIGHT: 64 IN | DIASTOLIC BLOOD PRESSURE: 86 MMHG | HEART RATE: 79 BPM

## 2021-07-28 DIAGNOSIS — M65.30 TRIGGER FINGER, ACQUIRED: Primary | ICD-10-CM

## 2021-07-28 DIAGNOSIS — G56.01 CARPAL TUNNEL SYNDROME OF RIGHT WRIST: ICD-10-CM

## 2021-07-28 PROCEDURE — 20526 THER INJECTION CARP TUNNEL: CPT | Mod: PBBFAC,RT | Performed by: ORTHOPAEDIC SURGERY

## 2021-07-28 PROCEDURE — 99213 PR OFFICE/OUTPT VISIT, EST, LEVL III, 20-29 MIN: ICD-10-PCS | Mod: S$PBB,25,, | Performed by: ORTHOPAEDIC SURGERY

## 2021-07-28 PROCEDURE — 99213 OFFICE O/P EST LOW 20 MIN: CPT | Mod: PBBFAC,25 | Performed by: ORTHOPAEDIC SURGERY

## 2021-07-28 PROCEDURE — 20550 TENDON SHEATH: ICD-10-PCS | Mod: S$PBB,51,LT, | Performed by: ORTHOPAEDIC SURGERY

## 2021-07-28 PROCEDURE — 99213 OFFICE O/P EST LOW 20 MIN: CPT | Mod: S$PBB,25,, | Performed by: ORTHOPAEDIC SURGERY

## 2021-07-28 PROCEDURE — 99999 PR PBB SHADOW E&M-EST. PATIENT-LVL III: CPT | Mod: PBBFAC,,, | Performed by: ORTHOPAEDIC SURGERY

## 2021-07-28 PROCEDURE — 20550 NJX 1 TENDON SHEATH/LIGAMENT: CPT | Mod: PBBFAC,LT | Performed by: ORTHOPAEDIC SURGERY

## 2021-07-28 PROCEDURE — 20526 CARPAL TUNNEL: ICD-10-PCS | Mod: S$PBB,59,RT, | Performed by: ORTHOPAEDIC SURGERY

## 2021-07-28 PROCEDURE — 99999 PR PBB SHADOW E&M-EST. PATIENT-LVL III: ICD-10-PCS | Mod: PBBFAC,,, | Performed by: ORTHOPAEDIC SURGERY

## 2021-07-28 RX ORDER — TRIAMCINOLONE ACETONIDE 40 MG/ML
40 INJECTION, SUSPENSION INTRA-ARTICULAR; INTRAMUSCULAR
Status: DISCONTINUED | OUTPATIENT
Start: 2021-07-28 | End: 2021-07-28 | Stop reason: HOSPADM

## 2021-07-28 RX ADMIN — TRIAMCINOLONE ACETONIDE 40 MG: 200 INJECTION, SUSPENSION INTRA-ARTICULAR; INTRAMUSCULAR at 04:07

## 2021-08-25 DIAGNOSIS — Z11.59 NEED FOR HEPATITIS C SCREENING TEST: ICD-10-CM

## 2021-11-23 ENCOUNTER — OFFICE VISIT (OUTPATIENT)
Dept: FAMILY MEDICINE | Facility: CLINIC | Age: 51
End: 2021-11-23
Attending: FAMILY MEDICINE
Payer: OTHER GOVERNMENT

## 2021-11-23 ENCOUNTER — HOSPITAL ENCOUNTER (OUTPATIENT)
Dept: RADIOLOGY | Facility: HOSPITAL | Age: 51
Discharge: HOME | End: 2021-11-23
Attending: FAMILY MEDICINE
Payer: OTHER GOVERNMENT

## 2021-11-23 VITALS
HEART RATE: 91 BPM | WEIGHT: 151.88 LBS | HEIGHT: 64 IN | TEMPERATURE: 98 F | SYSTOLIC BLOOD PRESSURE: 130 MMHG | BODY MASS INDEX: 25.93 KG/M2 | DIASTOLIC BLOOD PRESSURE: 88 MMHG | OXYGEN SATURATION: 98 %

## 2021-11-23 DIAGNOSIS — R07.81 RIB PAIN: ICD-10-CM

## 2021-11-23 DIAGNOSIS — R07.81 RIB PAIN: Primary | ICD-10-CM

## 2021-11-23 DIAGNOSIS — G89.29 CHRONIC MIDLINE LOW BACK PAIN WITHOUT SCIATICA: ICD-10-CM

## 2021-11-23 DIAGNOSIS — M54.50 CHRONIC MIDLINE LOW BACK PAIN WITHOUT SCIATICA: ICD-10-CM

## 2021-11-23 PROCEDURE — 99999 PR PBB SHADOW E&M-EST. PATIENT-LVL IV: CPT | Mod: PBBFAC,,, | Performed by: FAMILY MEDICINE

## 2021-11-23 PROCEDURE — 72100 X-RAY EXAM L-S SPINE 2/3 VWS: CPT | Mod: 26,,, | Performed by: RADIOLOGY

## 2021-11-23 PROCEDURE — 99214 PR OFFICE/OUTPT VISIT, EST, LEVL IV, 30-39 MIN: ICD-10-PCS | Mod: S$PBB,,, | Performed by: FAMILY MEDICINE

## 2021-11-23 PROCEDURE — 71110 X-RAY EXAM RIBS BIL 3 VIEWS: CPT | Mod: 26,,, | Performed by: RADIOLOGY

## 2021-11-23 PROCEDURE — 72100 XR LUMBAR SPINE AP AND LATERAL: ICD-10-PCS | Mod: 26,,, | Performed by: RADIOLOGY

## 2021-11-23 PROCEDURE — 99214 OFFICE O/P EST MOD 30 MIN: CPT | Mod: PBBFAC,PO | Performed by: FAMILY MEDICINE

## 2021-11-23 PROCEDURE — 71110 X-RAY EXAM RIBS BIL 3 VIEWS: CPT | Mod: TC,PO

## 2021-11-23 PROCEDURE — 72100 X-RAY EXAM L-S SPINE 2/3 VWS: CPT | Mod: TC,PO

## 2021-11-23 PROCEDURE — 99214 OFFICE O/P EST MOD 30 MIN: CPT | Mod: S$PBB,,, | Performed by: FAMILY MEDICINE

## 2021-11-23 PROCEDURE — 99999 PR PBB SHADOW E&M-EST. PATIENT-LVL IV: ICD-10-PCS | Mod: PBBFAC,,, | Performed by: FAMILY MEDICINE

## 2021-11-23 PROCEDURE — 71110 XR RIBS 3 VIEWS BILATERAL: ICD-10-PCS | Mod: 26,,, | Performed by: RADIOLOGY

## 2021-11-23 RX ORDER — DICLOFENAC SODIUM 75 MG/1
75 TABLET, DELAYED RELEASE ORAL 2 TIMES DAILY PRN
Qty: 20 TABLET | Refills: 0 | Status: SHIPPED | OUTPATIENT
Start: 2021-11-23 | End: 2022-06-18 | Stop reason: ALTCHOICE

## 2022-02-03 ENCOUNTER — TELEPHONE (OUTPATIENT)
Dept: PAIN MEDICINE | Facility: CLINIC | Age: 52
End: 2022-02-03
Payer: OTHER GOVERNMENT

## 2022-02-03 ENCOUNTER — TELEPHONE (OUTPATIENT)
Dept: PHYSICAL MEDICINE AND REHAB | Facility: CLINIC | Age: 52
End: 2022-02-03
Payer: OTHER GOVERNMENT

## 2022-02-03 ENCOUNTER — TELEPHONE (OUTPATIENT)
Dept: FAMILY MEDICINE | Facility: CLINIC | Age: 52
End: 2022-02-03
Payer: OTHER GOVERNMENT

## 2022-02-03 DIAGNOSIS — M51.36 DDD (DEGENERATIVE DISC DISEASE), LUMBAR: Primary | ICD-10-CM

## 2022-02-03 NOTE — TELEPHONE ENCOUNTER
Returned patient's phone call. Patient had questions about making appt with [facility].  [facility].  Patient to f/u with PCP per chart message from Dr. Contreras.    Keyona Weber RN

## 2022-02-03 NOTE — TELEPHONE ENCOUNTER
----- Message from Deonte Starkey sent at 2/3/2022  2:40 PM CST -----  Contact: BOO VILLEGAS [[id number]]  .Type:  Patient Requesting Referral    Who Called:BOO VILLEGAS [[id number]]  Does the patient already have the specialty appointment scheduled?: no   If yes, what is the date of that appointment?: no   Referral to What Specialty: back  and spine   Reason for Referral:  pt back    Does the patient want the referral with a specific physician?:  Is the [facility]  Patient Requesting a Response?:  yes   Would the patient rather a call back or a response via [facility]?  Call   Best Call Back Number: [phone]   Additional Information:

## 2022-02-07 ENCOUNTER — TELEPHONE (OUTPATIENT)
Dept: PAIN MEDICINE | Facility: CLINIC | Age: 52
End: 2022-02-07
Payer: OTHER GOVERNMENT

## 2022-02-07 NOTE — TELEPHONE ENCOUNTER
Please place internal referral for [facility] and Spine clinic.   I will contact pre-service department after referral is in.

## 2022-02-11 ENCOUNTER — TELEPHONE (OUTPATIENT)
Dept: ADMINISTRATIVE | Facility: OTHER | Age: 52
End: 2022-02-11
Payer: OTHER GOVERNMENT

## 2022-02-11 ENCOUNTER — PATIENT OUTREACH (OUTPATIENT)
Dept: ADMINISTRATIVE | Facility: OTHER | Age: 52
End: 2022-02-11
Payer: OTHER GOVERNMENT

## 2022-02-11 NOTE — PROGRESS NOTES
Health Maintenance Due   Topic Date Due    Hepatitis C Screening  Never done    COVID-19 Vaccine (1) Never done    Pneumococcal Vaccines (Age 0-64) (1 of 2 - PPSV23) Never done    HIV Screening  Never done    Influenza Vaccine (1) Never done    Mammogram  03/18/2022     Updates were requested from care everywhere.  Chart was reviewed for overdue [facility] Encounters ([facility]) topics (CRS, Breast Cancer Screening, Eye exam)  Health Maintenance has been updated.  LINKS immunization registry triggered.  Immunizations were reconciled.

## 2022-02-14 ENCOUNTER — OFFICE VISIT (OUTPATIENT)
Dept: PAIN MEDICINE | Facility: CLINIC | Age: 52
End: 2022-02-14
Payer: OTHER GOVERNMENT

## 2022-02-14 ENCOUNTER — HOSPITAL ENCOUNTER (OUTPATIENT)
Dept: RADIOLOGY | Facility: HOSPITAL | Age: 52
Discharge: HOME | End: 2022-02-14
Attending: PHYSICAL MEDICINE & REHABILITATION
Payer: OTHER GOVERNMENT

## 2022-02-14 VITALS
BODY MASS INDEX: 26.57 KG/M2 | HEIGHT: 64 IN | SYSTOLIC BLOOD PRESSURE: 129 MMHG | HEART RATE: 87 BPM | DIASTOLIC BLOOD PRESSURE: 91 MMHG | WEIGHT: 155.63 LBS

## 2022-02-14 DIAGNOSIS — M43.16 SPONDYLOLISTHESIS OF LUMBAR REGION: Primary | ICD-10-CM

## 2022-02-14 DIAGNOSIS — M54.2 NECK PAIN: ICD-10-CM

## 2022-02-14 DIAGNOSIS — M51.36 DDD (DEGENERATIVE DISC DISEASE), LUMBAR: ICD-10-CM

## 2022-02-14 PROCEDURE — 99215 OFFICE O/P EST HI 40 MIN: CPT | Mod: PBBFAC | Performed by: PHYSICAL MEDICINE & REHABILITATION

## 2022-02-14 PROCEDURE — 72052 X-RAY EXAM NECK SPINE 6/>VWS: CPT | Mod: 26,,, | Performed by: RADIOLOGY

## 2022-02-14 PROCEDURE — 99999 PR PBB SHADOW E&M-EST. PATIENT-LVL V: ICD-10-PCS | Mod: PBBFAC,,, | Performed by: PHYSICAL MEDICINE & REHABILITATION

## 2022-02-14 PROCEDURE — 99999 PR PBB SHADOW E&M-EST. PATIENT-LVL V: CPT | Mod: PBBFAC,,, | Performed by: PHYSICAL MEDICINE & REHABILITATION

## 2022-02-14 PROCEDURE — 72052 X-RAY EXAM NECK SPINE 6/>VWS: CPT | Mod: TC

## 2022-02-14 PROCEDURE — 99204 OFFICE O/P NEW MOD 45 MIN: CPT | Mod: S$PBB,,, | Performed by: PHYSICAL MEDICINE & REHABILITATION

## 2022-02-14 PROCEDURE — 99204 PR OFFICE/OUTPT VISIT, NEW, LEVL IV, 45-59 MIN: ICD-10-PCS | Mod: S$PBB,,, | Performed by: PHYSICAL MEDICINE & REHABILITATION

## 2022-02-14 PROCEDURE — 72052 XR CERVICAL SPINE 5 VIEW WITH FLEX AND EXT: ICD-10-PCS | Mod: 26,,, | Performed by: RADIOLOGY

## 2022-02-14 RX ORDER — CYCLOBENZAPRINE HCL 10 MG
TABLET ORAL
Qty: 60 TABLET | Refills: 1 | Status: SHIPPED | OUTPATIENT
Start: 2022-02-14 | End: 2022-04-06

## 2022-02-14 RX ORDER — GABAPENTIN 300 MG/1
300 CAPSULE ORAL 2 TIMES DAILY PRN
Qty: 60 CAPSULE | Refills: 11 | Status: SHIPPED | OUTPATIENT
Start: 2022-02-14 | End: 2023-02-14
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
## 2022-02-15 ENCOUNTER — TELEPHONE (OUTPATIENT)
Dept: NEUROSURGERY | Facility: CLINIC | Age: 52
End: 2022-02-15
Payer: OTHER GOVERNMENT

## 2022-02-15 ENCOUNTER — TELEPHONE (OUTPATIENT)
Dept: PAIN MEDICINE | Facility: CLINIC | Age: 52
End: 2022-02-15
Payer: OTHER GOVERNMENT

## 2022-02-15 NOTE — TELEPHONE ENCOUNTER
----- Message from Lisa Horowitz sent at 2/15/2022 10:46 AM CST -----  Contact: Rosario Ponce would like a call back in regards to Raina Weber calling but missing her call. Please call her or have Raina call the patient at [phone]

## 2022-02-17 ENCOUNTER — OFFICE VISIT (OUTPATIENT)
Dept: NEUROSURGERY | Facility: CLINIC | Age: 52
End: 2022-02-17
Payer: OTHER GOVERNMENT

## 2022-02-17 ENCOUNTER — HOSPITAL ENCOUNTER (OUTPATIENT)
Dept: RADIOLOGY | Facility: HOSPITAL | Age: 52
Discharge: HOME | End: 2022-02-17
Attending: PHYSICIAN ASSISTANT
Payer: OTHER GOVERNMENT

## 2022-02-17 VITALS
HEART RATE: 91 BPM | WEIGHT: 155 LBS | BODY MASS INDEX: 26.46 KG/M2 | SYSTOLIC BLOOD PRESSURE: 122 MMHG | DIASTOLIC BLOOD PRESSURE: 86 MMHG | HEIGHT: 64 IN

## 2022-02-17 DIAGNOSIS — M50.30 DDD (DEGENERATIVE DISC DISEASE), CERVICAL: ICD-10-CM

## 2022-02-17 DIAGNOSIS — M48.07 SPINAL STENOSIS, LUMBOSACRAL REGION: ICD-10-CM

## 2022-02-17 DIAGNOSIS — M43.16 SPONDYLOLISTHESIS OF LUMBAR REGION: Primary | ICD-10-CM

## 2022-02-17 DIAGNOSIS — M54.2 CERVICALGIA: ICD-10-CM

## 2022-02-17 DIAGNOSIS — M43.16 SPONDYLOLISTHESIS OF LUMBAR REGION: ICD-10-CM

## 2022-02-17 PROCEDURE — 99204 PR OFFICE/OUTPT VISIT, NEW, LEVL IV, 45-59 MIN: ICD-10-PCS | Mod: S$PBB,,, | Performed by: PHYSICIAN ASSISTANT

## 2022-02-17 PROCEDURE — 99204 OFFICE O/P NEW MOD 45 MIN: CPT | Mod: S$PBB,,, | Performed by: PHYSICIAN ASSISTANT

## 2022-02-17 PROCEDURE — 72082 X-RAY EXAM ENTIRE SPI 2/3 VW: CPT | Mod: 26,,, | Performed by: RADIOLOGY

## 2022-02-17 PROCEDURE — 72082 XR SPINE SURVEY AP AND LATERAL: ICD-10-PCS | Mod: 26,,, | Performed by: RADIOLOGY

## 2022-02-17 PROCEDURE — 99999 PR PBB SHADOW E&M-EST. PATIENT-LVL V: ICD-10-PCS | Mod: PBBFAC,,, | Performed by: PHYSICIAN ASSISTANT

## 2022-02-17 PROCEDURE — 99999 PR PBB SHADOW E&M-EST. PATIENT-LVL V: CPT | Mod: PBBFAC,,, | Performed by: PHYSICIAN ASSISTANT

## 2022-02-17 PROCEDURE — 72082 X-RAY EXAM ENTIRE SPI 2/3 VW: CPT | Mod: TC

## 2022-02-17 PROCEDURE — 99215 OFFICE O/P EST HI 40 MIN: CPT | Mod: PBBFAC,PO | Performed by: PHYSICIAN ASSISTANT
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
## 2022-03-04 ENCOUNTER — HOSPITAL ENCOUNTER (OUTPATIENT)
Dept: RADIOLOGY | Facility: HOSPITAL | Age: 52
Discharge: HOME | End: 2022-03-04
Attending: PHYSICIAN ASSISTANT
Payer: OTHER GOVERNMENT

## 2022-03-04 DIAGNOSIS — M48.07 SPINAL STENOSIS, LUMBOSACRAL REGION: ICD-10-CM

## 2022-03-04 DIAGNOSIS — M54.2 CERVICALGIA: ICD-10-CM

## 2022-03-04 PROCEDURE — 72148 MRI LUMBAR SPINE WITHOUT CONTRAST: ICD-10-PCS | Mod: 26,,, | Performed by: RADIOLOGY

## 2022-03-04 PROCEDURE — 72141 MRI NECK SPINE W/O DYE: CPT | Mod: 26,,, | Performed by: RADIOLOGY

## 2022-03-04 PROCEDURE — 72148 MRI LUMBAR SPINE W/O DYE: CPT | Mod: 26,,, | Performed by: RADIOLOGY

## 2022-03-04 PROCEDURE — 72141 MRI CERVICAL SPINE WITHOUT CONTRAST: ICD-10-PCS | Mod: 26,,, | Performed by: RADIOLOGY

## 2022-03-04 PROCEDURE — 72141 MRI NECK SPINE W/O DYE: CPT | Mod: TC

## 2022-03-04 PROCEDURE — 72148 MRI LUMBAR SPINE W/O DYE: CPT | Mod: TC

## 2022-03-08 ENCOUNTER — OFFICE VISIT (OUTPATIENT)
Dept: NEUROSURGERY | Facility: CLINIC | Age: 52
End: 2022-03-08
Payer: OTHER GOVERNMENT

## 2022-03-08 VITALS
WEIGHT: 155 LBS | SYSTOLIC BLOOD PRESSURE: 135 MMHG | HEART RATE: 83 BPM | RESPIRATION RATE: 16 BRPM | BODY MASS INDEX: 26.46 KG/M2 | DIASTOLIC BLOOD PRESSURE: 90 MMHG | HEIGHT: 64 IN

## 2022-03-08 DIAGNOSIS — M43.16 SPONDYLOLISTHESIS OF LUMBAR REGION: Primary | ICD-10-CM

## 2022-03-08 DIAGNOSIS — M51.36 DDD (DEGENERATIVE DISC DISEASE), LUMBAR: ICD-10-CM

## 2022-03-08 DIAGNOSIS — M54.9 DORSALGIA, UNSPECIFIED: ICD-10-CM

## 2022-03-08 PROCEDURE — 99213 OFFICE O/P EST LOW 20 MIN: CPT | Mod: S$PBB,,, | Performed by: PHYSICIAN ASSISTANT

## 2022-03-08 PROCEDURE — 99999 PR PBB SHADOW E&M-EST. PATIENT-LVL IV: CPT | Mod: PBBFAC,,, | Performed by: PHYSICIAN ASSISTANT

## 2022-03-08 PROCEDURE — 99214 OFFICE O/P EST MOD 30 MIN: CPT | Mod: PBBFAC,PO | Performed by: PHYSICIAN ASSISTANT

## 2022-03-08 PROCEDURE — 99213 PR OFFICE/OUTPT VISIT, EST, LEVL III, 20-29 MIN: ICD-10-PCS | Mod: S$PBB,,, | Performed by: PHYSICIAN ASSISTANT

## 2022-03-08 PROCEDURE — 99999 PR PBB SHADOW E&M-EST. PATIENT-LVL IV: ICD-10-PCS | Mod: PBBFAC,,, | Performed by: PHYSICIAN ASSISTANT
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
## 2022-03-18 ENCOUNTER — HOSPITAL ENCOUNTER (OUTPATIENT)
Dept: RADIOLOGY | Facility: HOSPITAL | Age: 52
Discharge: HOME | End: 2022-03-18
Attending: PHYSICIAN ASSISTANT
Payer: OTHER GOVERNMENT

## 2022-03-18 DIAGNOSIS — M54.9 DORSALGIA, UNSPECIFIED: ICD-10-CM

## 2022-03-18 PROCEDURE — 72131 CT LUMBAR SPINE WITHOUT CONTRAST: ICD-10-PCS | Mod: 26,,, | Performed by: RADIOLOGY

## 2022-03-18 PROCEDURE — 72131 CT LUMBAR SPINE W/O DYE: CPT | Mod: 26,,, | Performed by: RADIOLOGY

## 2022-03-18 PROCEDURE — 72131 CT LUMBAR SPINE W/O DYE: CPT | Mod: TC

## 2022-03-23 ENCOUNTER — PATIENT OUTREACH (OUTPATIENT)
Dept: ADMINISTRATIVE | Facility: OTHER | Age: 52
End: 2022-03-23
Payer: OTHER GOVERNMENT

## 2022-03-24 ENCOUNTER — TELEPHONE (OUTPATIENT)
Dept: NEUROSURGERY | Facility: CLINIC | Age: 52
End: 2022-03-24
Payer: OTHER GOVERNMENT

## 2022-03-24 ENCOUNTER — LAB VISIT (OUTPATIENT)
Dept: LAB | Facility: HOSPITAL | Age: 52
End: 2022-03-24
Attending: NEUROLOGICAL SURGERY
Payer: OTHER GOVERNMENT

## 2022-03-24 ENCOUNTER — TELEPHONE (OUTPATIENT)
Dept: PRIMARY CARE CLINIC | Facility: CLINIC | Age: 52
End: 2022-03-24
Payer: OTHER GOVERNMENT

## 2022-03-24 ENCOUNTER — OFFICE VISIT (OUTPATIENT)
Dept: NEUROSURGERY | Facility: CLINIC | Age: 52
End: 2022-03-24
Payer: OTHER GOVERNMENT

## 2022-03-24 ENCOUNTER — HOSPITAL ENCOUNTER (OUTPATIENT)
Dept: CARDIOLOGY | Facility: HOSPITAL | Age: 52
Discharge: HOME | End: 2022-03-24
Attending: NEUROLOGICAL SURGERY
Payer: OTHER GOVERNMENT

## 2022-03-24 VITALS
BODY MASS INDEX: 26.46 KG/M2 | WEIGHT: 155 LBS | HEIGHT: 64 IN | DIASTOLIC BLOOD PRESSURE: 82 MMHG | SYSTOLIC BLOOD PRESSURE: 118 MMHG | RESPIRATION RATE: 18 BRPM | HEART RATE: 89 BPM

## 2022-03-24 DIAGNOSIS — M51.36 DEGENERATIVE DISC DISEASE, LUMBAR: ICD-10-CM

## 2022-03-24 DIAGNOSIS — D69.9 BLEEDING DISORDER: ICD-10-CM

## 2022-03-24 DIAGNOSIS — M48.062 LUMBAR STENOSIS WITH NEUROGENIC CLAUDICATION: ICD-10-CM

## 2022-03-24 DIAGNOSIS — M54.16 LUMBAR RADICULOPATHY: ICD-10-CM

## 2022-03-24 DIAGNOSIS — Z01.818 PRE-OP TESTING: ICD-10-CM

## 2022-03-24 DIAGNOSIS — M50.30 DEGENERATIVE DISC DISEASE, CERVICAL: Primary | ICD-10-CM

## 2022-03-24 DIAGNOSIS — M43.16 SPONDYLOLISTHESIS, LUMBAR REGION: ICD-10-CM

## 2022-03-24 DIAGNOSIS — Z01.818 PRE-OP TESTING: Primary | ICD-10-CM

## 2022-03-24 LAB
ALBUMIN SERPL BCP-MCNC: 3.6 G/DL (ref 3.5–5.2)
ALP SERPL-CCNC: 60 U/L (ref 55–135)
ALT SERPL W/O P-5'-P-CCNC: 14 U/L (ref 10–44)
ANION GAP SERPL CALC-SCNC: 9 MMOL/L (ref 8–16)
APTT BLDCRRT: 29 SEC (ref 21–32)
AST SERPL-CCNC: 20 U/L (ref 10–40)
BASOPHILS # BLD AUTO: 0.05 K/UL (ref 0–0.2)
BASOPHILS NFR BLD: 0.5 % (ref 0–1.9)
BILIRUB SERPL-MCNC: 0.2 MG/DL (ref 0.1–1)
BUN SERPL-MCNC: 12 MG/DL (ref 6–20)
CALCIUM SERPL-MCNC: 9.8 MG/DL (ref 8.7–10.5)
CHLORIDE SERPL-SCNC: 103 MMOL/L (ref 95–110)
CO2 SERPL-SCNC: 26 MMOL/L (ref 23–29)
CREAT SERPL-MCNC: 0.6 MG/DL (ref 0.5–1.4)
DIFFERENTIAL METHOD: ABNORMAL
EOSINOPHIL # BLD AUTO: 0.1 K/UL (ref 0–0.5)
EOSINOPHIL NFR BLD: 0.6 % (ref 0–8)
ERYTHROCYTE [DISTWIDTH] IN BLOOD BY AUTOMATED COUNT: 12.5 % (ref 11.5–14.5)
EST. GFR  (AFRICAN AMERICAN): >60 ML/MIN/1.73 M^2
EST. GFR  (NON AFRICAN AMERICAN): >60 ML/MIN/1.73 M^2
GLUCOSE SERPL-MCNC: 78 MG/DL (ref 70–110)
HCT VFR BLD AUTO: 38.2 % (ref 37–48.5)
HGB BLD-MCNC: 12.4 G/DL (ref 12–16)
IMM GRANULOCYTES # BLD AUTO: 0.04 K/UL (ref 0–0.04)
IMM GRANULOCYTES NFR BLD AUTO: 0.4 % (ref 0–0.5)
INR PPP: 1 (ref 0.8–1.2)
LYMPHOCYTES # BLD AUTO: 2.2 K/UL (ref 1–4.8)
LYMPHOCYTES NFR BLD: 21.4 % (ref 18–48)
MCH RBC QN AUTO: 31.1 PG (ref 27–31)
MCHC RBC AUTO-ENTMCNC: 32.5 G/DL (ref 32–36)
MCV RBC AUTO: 96 FL (ref 82–98)
MONOCYTES # BLD AUTO: 0.8 K/UL (ref 0.3–1)
MONOCYTES NFR BLD: 7.3 % (ref 4–15)
NEUTROPHILS # BLD AUTO: 7.2 K/UL (ref 1.8–7.7)
NEUTROPHILS NFR BLD: 69.8 % (ref 38–73)
NRBC BLD-RTO: 0 /100 WBC
PLATELET # BLD AUTO: 516 K/UL (ref 150–450)
PMV BLD AUTO: 10.2 FL (ref 9.2–12.9)
POTASSIUM SERPL-SCNC: 5.1 MMOL/L (ref 3.5–5.1)
PROT SERPL-MCNC: 7.7 G/DL (ref 6–8.4)
PROTHROMBIN TIME: 10 SEC (ref 9–12.5)
RBC # BLD AUTO: 3.99 M/UL (ref 4–5.4)
SODIUM SERPL-SCNC: 138 MMOL/L (ref 136–145)
WBC # BLD AUTO: 10.3 K/UL (ref 3.9–12.7)

## 2022-03-24 PROCEDURE — 85610 PROTHROMBIN TIME: CPT | Performed by: NEUROLOGICAL SURGERY

## 2022-03-24 PROCEDURE — 85730 THROMBOPLASTIN TIME PARTIAL: CPT | Performed by: NEUROLOGICAL SURGERY

## 2022-03-24 PROCEDURE — 99214 OFFICE O/P EST MOD 30 MIN: CPT | Mod: PBBFAC,PO | Performed by: NEUROLOGICAL SURGERY

## 2022-03-24 PROCEDURE — 99999 PR PBB SHADOW E&M-EST. PATIENT-LVL IV: ICD-10-PCS | Mod: PBBFAC,,, | Performed by: NEUROLOGICAL SURGERY

## 2022-03-24 PROCEDURE — 93010 EKG 12-LEAD: ICD-10-PCS | Mod: ,,, | Performed by: INTERNAL MEDICINE

## 2022-03-24 PROCEDURE — 99999 PR PBB SHADOW E&M-EST. PATIENT-LVL IV: CPT | Mod: PBBFAC,,, | Performed by: NEUROLOGICAL SURGERY

## 2022-03-24 PROCEDURE — 99214 PR OFFICE/OUTPT VISIT, EST, LEVL IV, 30-39 MIN: ICD-10-PCS | Mod: S$PBB,,, | Performed by: NEUROLOGICAL SURGERY

## 2022-03-24 PROCEDURE — 36415 COLL VENOUS BLD VENIPUNCTURE: CPT | Performed by: NEUROLOGICAL SURGERY

## 2022-03-24 PROCEDURE — 99214 OFFICE O/P EST MOD 30 MIN: CPT | Mod: S$PBB,,, | Performed by: NEUROLOGICAL SURGERY

## 2022-03-24 PROCEDURE — 93010 ELECTROCARDIOGRAM REPORT: CPT | Mod: ,,, | Performed by: INTERNAL MEDICINE

## 2022-03-24 PROCEDURE — 85025 COMPLETE CBC W/AUTO DIFF WBC: CPT | Performed by: NEUROLOGICAL SURGERY

## 2022-03-24 PROCEDURE — 93005 ELECTROCARDIOGRAM TRACING: CPT

## 2022-03-24 PROCEDURE — 80053 COMPREHEN METABOLIC PANEL: CPT | Performed by: NEUROLOGICAL SURGERY
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
## 2022-03-24 NOTE — TELEPHONE ENCOUNTER
Called patient in regards to appointment for surgery clearance, no answer.          ----- Message from Estrellita Spaulding MA sent at 3/24/2022 12:10 PM CDT -----  Regarding: medical clearance  Hey how are you, can we get this pt in for a medical clearance per Dr Berry? Pt is planning to have a TLIF sx procedure in April, sx date TBD.    Thanks.

## 2022-04-05 ENCOUNTER — TELEPHONE (OUTPATIENT)
Dept: NEUROSURGERY | Facility: CLINIC | Age: 52
End: 2022-04-05
Payer: OTHER GOVERNMENT

## 2022-04-05 ENCOUNTER — TELEPHONE (OUTPATIENT)
Dept: PRIMARY CARE CLINIC | Facility: CLINIC | Age: 52
End: 2022-04-05
Payer: OTHER GOVERNMENT

## 2022-04-05 NOTE — TELEPHONE ENCOUNTER
----- Message from Mary Armijo sent at 4/5/2022  9:38 AM CDT -----  Contact: debi/kana/[phone]  Patient is returning a phone call.  Who left a message for the patient: MA  Does patient know what this is regarding:    Would you like a call back, or a response through your [facility] portal?:   CALL BACK  Comments:      PLEASE ADVISE

## 2022-04-05 NOTE — TELEPHONE ENCOUNTER
Spoke with pt informed her that we haven't got a sx date pt was advised that as soon as we get a date we'll notify her pt vu

## 2022-04-05 NOTE — TELEPHONE ENCOUNTER
Placed call to patient per patient she already received a returned call already issue has been resolved       ----- Message from Mary Armijo sent at 4/5/2022  9:38 AM CDT -----  Contact: debi/kana/[phone]  Patient is returning a phone call.  Who left a message for the patient: MA  Does patient know what this is regarding:    Would you like a call back, or a response through your [facility] portal?:   CALL BACK  Comments:      PLEASE ADVISE

## 2022-05-13 ENCOUNTER — PATIENT MESSAGE (OUTPATIENT)
Dept: SMOKING CESSATION | Facility: CLINIC | Age: 52
End: 2022-05-13
Payer: OTHER GOVERNMENT

## 2022-05-17 ENCOUNTER — TELEPHONE (OUTPATIENT)
Dept: PAIN MEDICINE | Facility: CLINIC | Age: 52
End: 2022-05-17
Payer: OTHER GOVERNMENT

## 2022-05-17 ENCOUNTER — PATIENT OUTREACH (OUTPATIENT)
Dept: ADMINISTRATIVE | Facility: OTHER | Age: 52
End: 2022-05-17
Payer: OTHER GOVERNMENT

## 2022-05-17 DIAGNOSIS — Z12.31 ENCOUNTER FOR SCREENING MAMMOGRAM FOR MALIGNANT NEOPLASM OF BREAST: Primary | ICD-10-CM

## 2022-05-18 ENCOUNTER — OFFICE VISIT (OUTPATIENT)
Dept: PAIN MEDICINE | Facility: CLINIC | Age: 52
End: 2022-05-18
Payer: OTHER GOVERNMENT

## 2022-05-18 VITALS
HEIGHT: 64 IN | HEART RATE: 79 BPM | BODY MASS INDEX: 28.53 KG/M2 | SYSTOLIC BLOOD PRESSURE: 134 MMHG | WEIGHT: 167.13 LBS | DIASTOLIC BLOOD PRESSURE: 93 MMHG

## 2022-05-18 DIAGNOSIS — M51.36 DDD (DEGENERATIVE DISC DISEASE), LUMBAR: ICD-10-CM

## 2022-05-18 DIAGNOSIS — M43.16 SPONDYLOLISTHESIS OF LUMBAR REGION: Primary | ICD-10-CM

## 2022-05-18 PROCEDURE — 99213 OFFICE O/P EST LOW 20 MIN: CPT | Mod: PBBFAC | Performed by: PHYSICAL MEDICINE & REHABILITATION

## 2022-05-18 PROCEDURE — 99214 PR OFFICE/OUTPT VISIT, EST, LEVL IV, 30-39 MIN: ICD-10-PCS | Mod: S$PBB,,, | Performed by: PHYSICAL MEDICINE & REHABILITATION

## 2022-05-18 PROCEDURE — 99999 PR PBB SHADOW E&M-EST. PATIENT-LVL III: ICD-10-PCS | Mod: PBBFAC,,, | Performed by: PHYSICAL MEDICINE & REHABILITATION

## 2022-05-18 PROCEDURE — 99214 OFFICE O/P EST MOD 30 MIN: CPT | Mod: S$PBB,,, | Performed by: PHYSICAL MEDICINE & REHABILITATION

## 2022-05-18 PROCEDURE — 99999 PR PBB SHADOW E&M-EST. PATIENT-LVL III: CPT | Mod: PBBFAC,,, | Performed by: PHYSICAL MEDICINE & REHABILITATION

## 2022-05-18 RX ORDER — AMITRIPTYLINE HYDROCHLORIDE 25 MG/1
25 TABLET, FILM COATED ORAL NIGHTLY PRN
Qty: 30 TABLET | Refills: 2 | Status: SHIPPED | OUTPATIENT
Start: 2022-05-18 | End: 2022-10-21 | Stop reason: SDUPTHER

## 2022-05-18 RX ORDER — CYCLOBENZAPRINE HCL 10 MG
TABLET ORAL
Qty: 60 TABLET | Refills: 2 | Status: SHIPPED | OUTPATIENT
Start: 2022-05-18 | End: 2022-08-01 | Stop reason: SDUPTHER

## 2022-05-18 NOTE — PROGRESS NOTES
Health Maintenance Due   Topic Date Due    Hepatitis C Screening  Never done    COVID-19 Vaccine (1) Never done    Pneumococcal Vaccines (Age 0-64) (1 - PCV) Never done    HIV Screening  Never done    Mammogram  03/18/2022     Updates were requested from care everywhere.  Chart was reviewed for overdue [facility]active [facility] Encounters ([facility]) topics (CRS, Breast Cancer Screening, Eye exam)  Health Maintenance has been updated.  [facility] immunization registry triggered.  Immunizations were reconciled.
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
## 2022-06-13 ENCOUNTER — PATIENT MESSAGE (OUTPATIENT)
Dept: NEUROSURGERY | Facility: CLINIC | Age: 52
End: 2022-06-13
Payer: OTHER GOVERNMENT

## 2022-06-18 ENCOUNTER — HOSPITAL ENCOUNTER (INPATIENT)
Facility: HOSPITAL | Age: 52
LOS: 3 days | Discharge: HOME | DRG: 390 | End: 2022-06-21
Attending: EMERGENCY MEDICINE | Admitting: SURGERY
Payer: OTHER GOVERNMENT

## 2022-06-18 DIAGNOSIS — K56.600 PARTIAL SMALL BOWEL OBSTRUCTION: Primary | ICD-10-CM

## 2022-06-18 DIAGNOSIS — K56.609 SBO (SMALL BOWEL OBSTRUCTION): ICD-10-CM

## 2022-06-18 DIAGNOSIS — Z01.89 ENCOUNTER FOR IMAGING STUDY TO CONFIRM NASOGASTRIC (NG) TUBE PLACEMENT: ICD-10-CM

## 2022-06-18 DIAGNOSIS — R51.9 ACUTE NONINTRACTABLE HEADACHE, UNSPECIFIED HEADACHE TYPE: ICD-10-CM

## 2022-06-18 DIAGNOSIS — Z72.0 TOBACCO ABUSE: ICD-10-CM

## 2022-06-18 DIAGNOSIS — K21.9 GASTROESOPHAGEAL REFLUX DISEASE, UNSPECIFIED WHETHER ESOPHAGITIS PRESENT: ICD-10-CM

## 2022-06-18 LAB
ALBUMIN SERPL BCP-MCNC: 4.1 G/DL (ref 3.5–5.2)
ALP SERPL-CCNC: 53 U/L (ref 55–135)
ALT SERPL W/O P-5'-P-CCNC: 17 U/L (ref 10–44)
ANION GAP SERPL CALC-SCNC: 10 MMOL/L (ref 8–16)
AST SERPL-CCNC: 21 U/L (ref 10–40)
B-HCG UR QL: NEGATIVE
BASOPHILS # BLD AUTO: 0.06 K/UL (ref 0–0.2)
BASOPHILS NFR BLD: 0.5 % (ref 0–1.9)
BILIRUB SERPL-MCNC: 0.2 MG/DL (ref 0.1–1)
BILIRUB UR QL STRIP: NEGATIVE
BUN SERPL-MCNC: 18 MG/DL (ref 6–20)
CALCIUM SERPL-MCNC: 9.6 MG/DL (ref 8.7–10.5)
CHLORIDE SERPL-SCNC: 104 MMOL/L (ref 95–110)
CLARITY UR: CLEAR
CO2 SERPL-SCNC: 22 MMOL/L (ref 23–29)
COLOR UR: YELLOW
CREAT SERPL-MCNC: 0.7 MG/DL (ref 0.5–1.4)
CTP QC/QA: YES
DIFFERENTIAL METHOD: ABNORMAL
EOSINOPHIL # BLD AUTO: 0 K/UL (ref 0–0.5)
EOSINOPHIL NFR BLD: 0.3 % (ref 0–8)
ERYTHROCYTE [DISTWIDTH] IN BLOOD BY AUTOMATED COUNT: 11.9 % (ref 11.5–14.5)
EST. GFR  (AFRICAN AMERICAN): >60 ML/MIN/1.73 M^2
EST. GFR  (NON AFRICAN AMERICAN): >60 ML/MIN/1.73 M^2
GLUCOSE SERPL-MCNC: 129 MG/DL (ref 70–110)
GLUCOSE UR QL STRIP: NEGATIVE
HCT VFR BLD AUTO: 42.3 % (ref 37–48.5)
HGB BLD-MCNC: 14.4 G/DL (ref 12–16)
HGB UR QL STRIP: ABNORMAL
IMM GRANULOCYTES # BLD AUTO: 0.05 K/UL (ref 0–0.04)
IMM GRANULOCYTES NFR BLD AUTO: 0.4 % (ref 0–0.5)
KETONES UR QL STRIP: NEGATIVE
LEUKOCYTE ESTERASE UR QL STRIP: NEGATIVE
LIPASE SERPL-CCNC: 23 U/L (ref 4–60)
LYMPHOCYTES # BLD AUTO: 0.6 K/UL (ref 1–4.8)
LYMPHOCYTES NFR BLD: 5.1 % (ref 18–48)
MCH RBC QN AUTO: 31.7 PG (ref 27–31)
MCHC RBC AUTO-ENTMCNC: 34 G/DL (ref 32–36)
MCV RBC AUTO: 93 FL (ref 82–98)
MONOCYTES # BLD AUTO: 0.9 K/UL (ref 0.3–1)
MONOCYTES NFR BLD: 7.5 % (ref 4–15)
NEUTROPHILS # BLD AUTO: 10.4 K/UL (ref 1.8–7.7)
NEUTROPHILS NFR BLD: 86.2 % (ref 38–73)
NITRITE UR QL STRIP: NEGATIVE
NRBC BLD-RTO: 0 /100 WBC
PH UR STRIP: 7 [PH] (ref 5–8)
PLATELET # BLD AUTO: 396 K/UL (ref 150–450)
PMV BLD AUTO: 9.5 FL (ref 9.2–12.9)
POTASSIUM SERPL-SCNC: 4.9 MMOL/L (ref 3.5–5.1)
PROT SERPL-MCNC: 7.3 G/DL (ref 6–8.4)
PROT UR QL STRIP: NEGATIVE
RBC # BLD AUTO: 4.54 M/UL (ref 4–5.4)
SARS-COV-2 RDRP RESP QL NAA+PROBE: NEGATIVE
SODIUM SERPL-SCNC: 136 MMOL/L (ref 136–145)
SP GR UR STRIP: <=1.005 (ref 1–1.03)
URN SPEC COLLECT METH UR: ABNORMAL
UROBILINOGEN UR STRIP-ACNC: NEGATIVE EU/DL
WBC # BLD AUTO: 12.04 K/UL (ref 3.9–12.7)

## 2022-06-18 PROCEDURE — 96374 THER/PROPH/DIAG INJ IV PUSH: CPT

## 2022-06-18 PROCEDURE — 81025 URINE PREGNANCY TEST: CPT | Performed by: EMERGENCY MEDICINE

## 2022-06-18 PROCEDURE — 11000001 HC ACUTE MED/SURG PRIVATE ROOM

## 2022-06-18 PROCEDURE — 25000003 PHARM REV CODE 250: Performed by: SURGERY

## 2022-06-18 PROCEDURE — 85025 COMPLETE CBC W/AUTO DIFF WBC: CPT | Performed by: EMERGENCY MEDICINE

## 2022-06-18 PROCEDURE — 63600175 PHARM REV CODE 636 W HCPCS: Performed by: EMERGENCY MEDICINE

## 2022-06-18 PROCEDURE — 63600175 PHARM REV CODE 636 W HCPCS: Performed by: SURGERY

## 2022-06-18 PROCEDURE — U0002 COVID-19 LAB TEST NON-CDC: HCPCS | Performed by: EMERGENCY MEDICINE

## 2022-06-18 PROCEDURE — S4991 NICOTINE PATCH NONLEGEND: HCPCS | Performed by: SURGERY

## 2022-06-18 PROCEDURE — 99285 EMERGENCY DEPT VISIT HI MDM: CPT | Mod: 25

## 2022-06-18 PROCEDURE — 81003 URINALYSIS AUTO W/O SCOPE: CPT | Performed by: EMERGENCY MEDICINE

## 2022-06-18 PROCEDURE — 96361 HYDRATE IV INFUSION ADD-ON: CPT

## 2022-06-18 PROCEDURE — C9113 INJ PANTOPRAZOLE SODIUM, VIA: HCPCS | Performed by: SURGERY

## 2022-06-18 PROCEDURE — 83690 ASSAY OF LIPASE: CPT | Performed by: EMERGENCY MEDICINE

## 2022-06-18 PROCEDURE — 25000003 PHARM REV CODE 250: Performed by: EMERGENCY MEDICINE

## 2022-06-18 PROCEDURE — 80053 COMPREHEN METABOLIC PANEL: CPT | Performed by: EMERGENCY MEDICINE

## 2022-06-18 PROCEDURE — 96375 TX/PRO/DX INJ NEW DRUG ADDON: CPT

## 2022-06-18 RX ORDER — MORPHINE SULFATE 4 MG/ML
4 INJECTION, SOLUTION INTRAMUSCULAR; INTRAVENOUS EVERY 4 HOURS PRN
Status: DISCONTINUED | OUTPATIENT
Start: 2022-06-18 | End: 2022-06-19

## 2022-06-18 RX ORDER — DEXTROSE MONOHYDRATE, SODIUM CHLORIDE, AND POTASSIUM CHLORIDE 50; 1.49; 4.5 G/1000ML; G/1000ML; G/1000ML
INJECTION, SOLUTION INTRAVENOUS CONTINUOUS
Status: DISCONTINUED | OUTPATIENT
Start: 2022-06-18 | End: 2022-06-21

## 2022-06-18 RX ORDER — MORPHINE SULFATE 4 MG/ML
4 INJECTION, SOLUTION INTRAMUSCULAR; INTRAVENOUS
Status: COMPLETED | OUTPATIENT
Start: 2022-06-18 | End: 2022-06-18

## 2022-06-18 RX ORDER — MORPHINE SULFATE 4 MG/ML
4 INJECTION, SOLUTION INTRAMUSCULAR; INTRAVENOUS EVERY 4 HOURS PRN
Status: DISCONTINUED | OUTPATIENT
Start: 2022-06-18 | End: 2022-06-18 | Stop reason: SDUPTHER

## 2022-06-18 RX ORDER — ONDANSETRON 2 MG/ML
4 INJECTION INTRAMUSCULAR; INTRAVENOUS
Status: COMPLETED | OUTPATIENT
Start: 2022-06-18 | End: 2022-06-18

## 2022-06-18 RX ORDER — ENOXAPARIN SODIUM 100 MG/ML
40 INJECTION SUBCUTANEOUS EVERY 24 HOURS
Status: DISCONTINUED | OUTPATIENT
Start: 2022-06-18 | End: 2022-06-21 | Stop reason: HOSPADM

## 2022-06-18 RX ORDER — MORPHINE SULFATE 4 MG/ML
2 INJECTION, SOLUTION INTRAMUSCULAR; INTRAVENOUS EVERY 4 HOURS PRN
Status: DISCONTINUED | OUTPATIENT
Start: 2022-06-18 | End: 2022-06-19

## 2022-06-18 RX ORDER — ACETAMINOPHEN 325 MG/1
650 TABLET ORAL EVERY 8 HOURS PRN
Status: DISCONTINUED | OUTPATIENT
Start: 2022-06-18 | End: 2022-06-21 | Stop reason: HOSPADM

## 2022-06-18 RX ORDER — ACETAMINOPHEN 325 MG/1
650 TABLET ORAL EVERY 6 HOURS PRN
Status: DISCONTINUED | OUTPATIENT
Start: 2022-06-18 | End: 2022-06-21 | Stop reason: HOSPADM

## 2022-06-18 RX ORDER — SODIUM CHLORIDE 9 MG/ML
INJECTION, SOLUTION INTRAVENOUS CONTINUOUS
Status: CANCELLED | OUTPATIENT
Start: 2022-06-18

## 2022-06-18 RX ORDER — SODIUM CHLORIDE 0.9 % (FLUSH) 0.9 %
10 SYRINGE (ML) INJECTION
Status: DISCONTINUED | OUTPATIENT
Start: 2022-06-18 | End: 2022-06-21 | Stop reason: HOSPADM

## 2022-06-18 RX ORDER — ONDANSETRON 2 MG/ML
4 INJECTION INTRAMUSCULAR; INTRAVENOUS EVERY 12 HOURS PRN
Status: DISCONTINUED | OUTPATIENT
Start: 2022-06-18 | End: 2022-06-18 | Stop reason: SDUPTHER

## 2022-06-18 RX ORDER — TALC
6 POWDER (GRAM) TOPICAL NIGHTLY PRN
Status: DISCONTINUED | OUTPATIENT
Start: 2022-06-18 | End: 2022-06-20 | Stop reason: SDUPTHER

## 2022-06-18 RX ORDER — PANTOPRAZOLE SODIUM 40 MG/10ML
40 INJECTION, POWDER, LYOPHILIZED, FOR SOLUTION INTRAVENOUS DAILY
Status: DISCONTINUED | OUTPATIENT
Start: 2022-06-18 | End: 2022-06-21 | Stop reason: HOSPADM

## 2022-06-18 RX ORDER — FAMOTIDINE 10 MG/ML
20 INJECTION INTRAVENOUS EVERY 12 HOURS
Status: DISCONTINUED | OUTPATIENT
Start: 2022-06-18 | End: 2022-06-18

## 2022-06-18 RX ORDER — METHOCARBAMOL 100 MG/ML
1000 INJECTION, SOLUTION INTRAMUSCULAR; INTRAVENOUS EVERY 8 HOURS
Status: DISCONTINUED | OUTPATIENT
Start: 2022-06-18 | End: 2022-06-21 | Stop reason: HOSPADM

## 2022-06-18 RX ORDER — ONDANSETRON 2 MG/ML
4 INJECTION INTRAMUSCULAR; INTRAVENOUS EVERY 6 HOURS PRN
Status: DISCONTINUED | OUTPATIENT
Start: 2022-06-18 | End: 2022-06-18

## 2022-06-18 RX ORDER — IBUPROFEN 200 MG
1 TABLET ORAL DAILY
Status: DISCONTINUED | OUTPATIENT
Start: 2022-06-18 | End: 2022-06-20 | Stop reason: SDUPTHER

## 2022-06-18 RX ORDER — ONDANSETRON 2 MG/ML
4 INJECTION INTRAMUSCULAR; INTRAVENOUS EVERY 6 HOURS PRN
Status: DISCONTINUED | OUTPATIENT
Start: 2022-06-18 | End: 2022-06-21 | Stop reason: HOSPADM

## 2022-06-18 RX ADMIN — NICOTINE 1 PATCH: 21 PATCH, EXTENDED RELEASE TRANSDERMAL at 02:06

## 2022-06-18 RX ADMIN — ONDANSETRON 4 MG: 2 INJECTION INTRAMUSCULAR; INTRAVENOUS at 10:06

## 2022-06-18 RX ADMIN — ONDANSETRON 4 MG: 2 INJECTION INTRAMUSCULAR; INTRAVENOUS at 06:06

## 2022-06-18 RX ADMIN — PANTOPRAZOLE SODIUM 40 MG: 40 INJECTION, POWDER, FOR SOLUTION INTRAVENOUS at 02:06

## 2022-06-18 RX ADMIN — POTASSIUM CHLORIDE, DEXTROSE MONOHYDRATE AND SODIUM CHLORIDE: 150; 5; 450 INJECTION, SOLUTION INTRAVENOUS at 03:06

## 2022-06-18 RX ADMIN — ONDANSETRON 4 MG: 2 INJECTION INTRAMUSCULAR; INTRAVENOUS at 03:06

## 2022-06-18 RX ADMIN — ACETAMINOPHEN 650 MG: 325 TABLET ORAL at 02:06

## 2022-06-18 RX ADMIN — PROMETHAZINE HYDROCHLORIDE 12.5 MG: 25 INJECTION INTRAMUSCULAR; INTRAVENOUS at 08:06

## 2022-06-18 RX ADMIN — ENOXAPARIN SODIUM 40 MG: 40 INJECTION SUBCUTANEOUS at 05:06

## 2022-06-18 RX ADMIN — MORPHINE SULFATE 4 MG: 4 INJECTION INTRAVENOUS at 09:06

## 2022-06-18 RX ADMIN — SODIUM CHLORIDE 1000 ML: 0.9 INJECTION, SOLUTION INTRAVENOUS at 06:06

## 2022-06-18 RX ADMIN — METHOCARBAMOL 1000 MG: 100 INJECTION, SOLUTION INTRAMUSCULAR; INTRAVENOUS at 09:06

## 2022-06-18 RX ADMIN — MORPHINE SULFATE 4 MG: 4 INJECTION INTRAVENOUS at 06:06

## 2022-06-18 RX ADMIN — POTASSIUM CHLORIDE, DEXTROSE MONOHYDRATE AND SODIUM CHLORIDE: 150; 5; 450 INJECTION, SOLUTION INTRAVENOUS at 11:06

## 2022-06-18 RX ADMIN — MORPHINE SULFATE 4 MG: 4 INJECTION INTRAVENOUS at 05:06

## 2022-06-18 RX ADMIN — ONDANSETRON 4 MG: 2 INJECTION INTRAMUSCULAR; INTRAVENOUS at 09:06

## 2022-06-18 RX ADMIN — MORPHINE SULFATE 4 MG: 4 INJECTION INTRAVENOUS at 01:06
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
## 2022-06-18 NOTE — PLAN OF CARE
Pt remains free from injury/falls this shift. Safety precautions maintained. Pain managed with prn meds. NPO status maintained. NGT to LIWS. VSS. No signs and symptoms of acute distress noted at this time. Chart reviewed, will continue to monitor.

## 2022-06-18 NOTE — ED PROVIDER NOTES
Encounter Date: 6/18/2022       History     Chief Complaint   Patient presents with    Abdominal Pain     Pt CO diffused abd pain x1 day. CO N/V no D. Pos GI hx     HPI   51-year-old white female with history of small-bowel obstruction presenting with crampy abdominal pain that is generalized onset o'clock a.m. yesterday.  She has had a bowel movement yesterday but none today.  Patient does note some nausea vomiting but no diarrhea.  No fevers or chills.  She denies any urinary complaints as well history abdominal surgery for her bladder, small bowel obstruction and hernia repair.  She does smoke.    Review of patient's allergies indicates:   Allergen Reactions    Sulfa (sulfonamide antibiotics)      Past Medical History:   Diagnosis Date    PONV (postoperative nausea and vomiting)      Past Surgical History:   Procedure Laterality Date    BLADDER REPAIR      CARPAL TUNNEL RELEASE Right 4/5/2021    Procedure: RELEASE, CARPAL TUNNEL;  Surgeon: Michael Frank MD;  Location: [facility] OR;  Service: Orthopedics;  Laterality: Right;    COLONOSCOPY N/A 5/19/2021    Procedure: COLONOSCOPY;  Surgeon: Emeka Pritchett MD;  Location: [facility];  Service: General;  Laterality: N/A;    EYE SURGERY Left     HERNIA REPAIR      SMALL INTESTINE SURGERY      TONSILLECTOMY      TUBAL LIGATION      URETHRA SURGERY       Family History   Problem Relation Age of Onset    Prostate cancer Father      Social History     Tobacco Use    Smoking status: Current Every Day Smoker     Packs/day: 1.00     Years: 10.00     Pack years: 10.00     Types: Cigarettes    Smokeless tobacco: Never Used   Substance Use Topics    Alcohol use: Not Currently    Drug use: Never     Review of Systems   Constitutional: Negative for chills and fever.   HENT: Negative for nosebleeds and sore throat.    Respiratory: Negative for cough and chest tightness.    Cardiovascular: Negative for chest pain and palpitations.   Gastrointestinal: Positive  for abdominal pain, nausea and vomiting. Negative for constipation and diarrhea.   Genitourinary: Negative for dysuria and frequency.   Musculoskeletal: Negative for arthralgias, back pain and myalgias.   Neurological: Negative for weakness and numbness.   All other systems reviewed and are negative.      Physical Exam     Initial Vitals [06/18/22 0438]   BP Pulse Resp Temp SpO2   132/86 (!) 114 (!) 28 97.9 °F (36.6 °C) 98 %      MAP       --         Physical Exam  Nursing Notes and Vital Signs Reviewed.  Constitutional: Patient is in   Moderate distress. Well-developed and well-nourished.  Head: Atraumatic. Normocephalic.  Eyes:  EOM intact.  No scleral icterus.  ENT: Mucous membranes are moist.  Nares clear   Neck:  Full ROM. No JVD.  Cardiovascular: Regular rate. Regular rhythm No murmurs, rubs, or gallops. Distal pulses are 2+ and symmetric  Pulmonary/Chest: No respiratory distress. Clear to auscultation bilaterally. No wheezing or rales.  Equal chest wall rise bilaterally  Abdominal: Soft and non-distended.   Mild diffuse tenderness.  No guarding or rebound.  Genitourinary: No CVA tenderness.  No suprapubic tenderness  Musculoskeletal: Moves all extremities. No obvious deformities.  5 x 5 strength in all extremities   Skin: Warm and dry.  Neurological:  Alert, awake, and appropriate.  Normal speech.  No acute focal neurological deficits are appreciated.  Two through 12 intact bilaterally.  Psychiatric: Normal affect. Good eye contact. Appropriate in content.    ED Course   Procedures  Labs Reviewed   CBC W/ AUTO DIFFERENTIAL - Abnormal; Notable for the following components:       Result Value    MCH 31.7 (*)     Gran # (ANC) 10.4 (*)     Immature Grans (Abs) 0.05 (*)     Lymph # 0.6 (*)     Gran % 86.2 (*)     Lymph % 5.1 (*)     All other components within normal limits   COMPREHENSIVE METABOLIC PANEL - Abnormal; Notable for the following components:    CO2 22 (*)     Glucose 129 (*)     Alkaline Phosphatase  53 (*)     All other components within normal limits   URINALYSIS, REFLEX TO URINE CULTURE - Abnormal; Notable for the following components:    Specific Gravity, UA <=1.005 (*)     Occult Blood UA Trace (*)     All other components within normal limits    Narrative:     Specimen Source->Urine   LIPASE   PREGNANCY TEST, URINE RAPID   SARS-COV-2 RDRP GENE          Imaging Results          CT Abdomen Pelvis  Without Contrast (Final result)  Result time 06/18/22 07:29:16    Final result by Ricardo Shay MD (06/18/22 07:29:16)                 Impression:      1. Findings worrisome for early small bowel obstruction or partial small bowel obstruction.  New since prior exam 05/17/2021.  See above for details.  2. Previous right ovarian cyst resolved.  All CT scans at this facility are performed  using dose modulation techniques as appropriate to performed exam including the following:  automated exposure control; adjustment of mA and/or kV according to the patients size (this includes techniques or standardized protocols for targeted exams where dose is matched to indication/reason for exam: i.e. extremities or head);  iterative reconstruction technique.      Electronically signed by: Ricardo Shay  Date:    06/18/2022  Time:    07:29             Narrative:    EXAMINATION:  CT ABDOMEN PELVIS WITHOUT CONTRAST    CLINICAL HISTORY:  Bowel obstruction suspected;. Abdominal pain.  Nausea and vomiting.    TECHNIQUE:  Low dose axial images, sagittal and coronal reformations were obtained from the lung bases to the pubic symphysis.    COMPARISON:  05/17/2021    FINDINGS:  Minimal atelectasis posterior right lung base.  Dependent atelectasis posterior left lung base.  Visualized heart normal in size.    The liver is normal.  The gallbladder is normal.    Normal spleen.  Normal pancreas.  Mild thickening medial limb left adrenal gland.  Low-density nodule medial limb right adrenal gland with Hounsfield units measuring  -4, consistent with an adenoma (13 mm).  No change since prior exam.  Aorta and iliac vasculature normal in caliber with atherosclerotic calcification.    The left kidney is normal.  Left ureter is normal.  Minimal prominence of the right renal pelvis and proximal ureter but no hydronephrosis.  No obstructing lesion.    Stomach is distended with fluid, worse since prior exam.  Within the lower abdomen are new since the prior examination.  Collapse normal caliber ileal loops.  Findings worrisome for early or partial small bowel obstruction.  Normal appendix.  Dilated fluid-filled loops of small bowel the colon is normal.  The rectum is normal.    The pelvis demonstrates distended, normal bladder.  Uterus normal.  No adnexal masses.  Previous right ovarian cyst resolved.    No significant abnormality of the abdominopelvic wall soft tissues.  Grade 2 isthmic spondylolisthesis L4-L5, unchanged since the prior exam.  No suspicious osseous lesions.                                 Medications   sodium chloride 0.9% bolus 1,000 mL (1,000 mLs Intravenous New Bag 6/18/22 0621)   ondansetron injection 4 mg (4 mg Intravenous Given 6/18/22 0621)   morphine injection 4 mg (4 mg Intravenous Given 6/18/22 0621)      7:59 AM   consultation obtained with Dr. Lopes in General surgery.  Requested NG tube in admit to her service.  Patient is aware.                    Clinical Impression:   Final diagnoses:  [K56.600] Partial small bowel obstruction (Primary)          ED Disposition Condition    Admit               Ramo Mcgee Jr., MD  06/18/22 0800

## 2022-06-18 NOTE — PHARMACY MED REC
"Admission Medication History     The home medication history was taken by Asad Saenz.    You may go to "Admission" then "Reconcile Home Medications" tabs to review and/or act upon these items.      The home medication list has been updated by the Pharmacy department.    Please read ALL comments highlighted in yellow.    Please address this information as you see fit.     Feel free to contact us if you have any questions or require assistance.    6 discrepancies were found in dosage and strength; meds were added and removed. Please review list and notes. Thanks!    The medications listed below were removed from the home medication list. Please reorder if appropriate:  Patient reports no longer taking the following medication(s):   DICLOFENAC 75 MG EC TABLET    Medications listed below were obtained from: Patient/family, [employer] and Patient's pharmacy  (Not in a hospital admission)      Potential issues to be addressed PRIOR TO DISCHARGE: NONE      Asad Saenz, [facility]  Pharmacy Technician Specialist-Medication History  [facility] [phone]  Secure chat preferred.     Current Outpatient Medications on File Prior to Encounter   Medication Sig Dispense Refill Last Dose    amitriptyline (ELAVIL) 25 MG tablet Take 1 tablet (25 mg total) by mouth nightly as needed for Insomnia. 30 tablet 2 Past Week at Thursday, 06/16/2022    ascorbate calcium (JULIA-C ORAL) Take 1,000 mg by mouth once daily.   6/17/2022 at Unknown time    aspirin-acetaminophen-caffeine 250-250-65 mg (EXCEDRIN MIGRAINE) 250-250-65 mg per tablet Take 3 tablets by mouth every 4 to 6 hours as needed for Pain.   6/17/2022 at Unknown time    cyclobenzaprine (FLEXERIL) 10 MG tablet TAKE 1/2 TO 1 (ONE-HALF TO ONE) TABLET BY MOUTH TWICE DAILY AS NEEDED FOR MUSCLE SPASM MAY CAUSE DROWSINESS 60 tablet 2 6/17/2022 at Unknown time    gabapentin (NEURONTIN) 300 MG capsule Take 1 capsule (300 mg total) by mouth 2 (two) times daily as " needed (pain). 60 capsule 11 6/17/2022 at Unknown time    inulin (FIBER GUMMIES ORAL) Take 1 Piece by mouth once daily.   6/17/2022 at Unknown time    multivitamin capsule Take 1 capsule by mouth once daily.   6/17/2022 at Unknown time    omeprazole (PRILOSEC) 40 MG capsule Take 1 capsule (40 mg total) by mouth once daily. 30 capsule 6 6/17/2022 at Unknown time    polyethylene glycol (GLYCOLAX) 17 gram/dose powder Take 17 g by mouth nightly.   6/17/2022 at Unknown time    [DISCONTINUED] ascorbic acid, vitamin C, (VITAMIN C) 1000 MG tablet Take 1,000 mg by mouth once daily.       [DISCONTINUED] diclofenac (VOLTAREN) 75 MG EC tablet Take 1 tablet (75 mg total) by mouth 2 (two) times daily as needed. 20 tablet 0                              .

## 2022-06-19 PROBLEM — R51.9 HEADACHE: Status: ACTIVE | Noted: 2022-06-19

## 2022-06-19 LAB
ALBUMIN SERPL BCP-MCNC: 3.5 G/DL (ref 3.5–5.2)
ALP SERPL-CCNC: 46 U/L (ref 55–135)
ALT SERPL W/O P-5'-P-CCNC: 15 U/L (ref 10–44)
ANION GAP SERPL CALC-SCNC: 8 MMOL/L (ref 8–16)
AST SERPL-CCNC: 16 U/L (ref 10–40)
BASOPHILS # BLD AUTO: 0.01 K/UL (ref 0–0.2)
BASOPHILS NFR BLD: 0.1 % (ref 0–1.9)
BILIRUB SERPL-MCNC: 0.1 MG/DL (ref 0.1–1)
BUN SERPL-MCNC: 12 MG/DL (ref 6–20)
CALCIUM SERPL-MCNC: 8.6 MG/DL (ref 8.7–10.5)
CHLORIDE SERPL-SCNC: 107 MMOL/L (ref 95–110)
CO2 SERPL-SCNC: 23 MMOL/L (ref 23–29)
CREAT SERPL-MCNC: 0.7 MG/DL (ref 0.5–1.4)
DIFFERENTIAL METHOD: ABNORMAL
EOSINOPHIL # BLD AUTO: 0 K/UL (ref 0–0.5)
EOSINOPHIL NFR BLD: 0 % (ref 0–8)
ERYTHROCYTE [DISTWIDTH] IN BLOOD BY AUTOMATED COUNT: 12.4 % (ref 11.5–14.5)
EST. GFR  (AFRICAN AMERICAN): >60 ML/MIN/1.73 M^2
EST. GFR  (NON AFRICAN AMERICAN): >60 ML/MIN/1.73 M^2
GLUCOSE SERPL-MCNC: 130 MG/DL (ref 70–110)
HCT VFR BLD AUTO: 40 % (ref 37–48.5)
HGB BLD-MCNC: 12.9 G/DL (ref 12–16)
IMM GRANULOCYTES # BLD AUTO: 0.03 K/UL (ref 0–0.04)
IMM GRANULOCYTES NFR BLD AUTO: 0.4 % (ref 0–0.5)
LYMPHOCYTES # BLD AUTO: 0.3 K/UL (ref 1–4.8)
LYMPHOCYTES NFR BLD: 3.4 % (ref 18–48)
MCH RBC QN AUTO: 31.2 PG (ref 27–31)
MCHC RBC AUTO-ENTMCNC: 32.3 G/DL (ref 32–36)
MCV RBC AUTO: 97 FL (ref 82–98)
MONOCYTES # BLD AUTO: 0.7 K/UL (ref 0.3–1)
MONOCYTES NFR BLD: 9.1 % (ref 4–15)
NEUTROPHILS # BLD AUTO: 6.9 K/UL (ref 1.8–7.7)
NEUTROPHILS NFR BLD: 87 % (ref 38–73)
NRBC BLD-RTO: 0 /100 WBC
PLATELET # BLD AUTO: 304 K/UL (ref 150–450)
PMV BLD AUTO: 9.5 FL (ref 9.2–12.9)
POTASSIUM SERPL-SCNC: 4.8 MMOL/L (ref 3.5–5.1)
PROT SERPL-MCNC: 6.6 G/DL (ref 6–8.4)
RBC # BLD AUTO: 4.13 M/UL (ref 4–5.4)
SODIUM SERPL-SCNC: 138 MMOL/L (ref 136–145)
WBC # BLD AUTO: 7.89 K/UL (ref 3.9–12.7)

## 2022-06-19 PROCEDURE — 25000003 PHARM REV CODE 250: Performed by: SURGERY

## 2022-06-19 PROCEDURE — 25000003 PHARM REV CODE 250: Performed by: EMERGENCY MEDICINE

## 2022-06-19 PROCEDURE — 85025 COMPLETE CBC W/AUTO DIFF WBC: CPT | Performed by: SURGERY

## 2022-06-19 PROCEDURE — C9113 INJ PANTOPRAZOLE SODIUM, VIA: HCPCS | Performed by: SURGERY

## 2022-06-19 PROCEDURE — 36415 COLL VENOUS BLD VENIPUNCTURE: CPT | Performed by: SURGERY

## 2022-06-19 PROCEDURE — 63600175 PHARM REV CODE 636 W HCPCS: Performed by: INTERNAL MEDICINE

## 2022-06-19 PROCEDURE — S4991 NICOTINE PATCH NONLEGEND: HCPCS | Performed by: SURGERY

## 2022-06-19 PROCEDURE — 11000001 HC ACUTE MED/SURG PRIVATE ROOM

## 2022-06-19 PROCEDURE — 80053 COMPREHEN METABOLIC PANEL: CPT | Performed by: SURGERY

## 2022-06-19 PROCEDURE — 63600175 PHARM REV CODE 636 W HCPCS: Performed by: SURGERY

## 2022-06-19 RX ORDER — HYDROMORPHONE HYDROCHLORIDE 2 MG/ML
1 INJECTION, SOLUTION INTRAMUSCULAR; INTRAVENOUS; SUBCUTANEOUS EVERY 4 HOURS PRN
Status: DISCONTINUED | OUTPATIENT
Start: 2022-06-19 | End: 2022-06-20

## 2022-06-19 RX ADMIN — PROMETHAZINE HYDROCHLORIDE 12.5 MG: 25 INJECTION INTRAMUSCULAR; INTRAVENOUS at 12:06

## 2022-06-19 RX ADMIN — POTASSIUM CHLORIDE, DEXTROSE MONOHYDRATE AND SODIUM CHLORIDE: 150; 5; 450 INJECTION, SOLUTION INTRAVENOUS at 09:06

## 2022-06-19 RX ADMIN — PROMETHAZINE HYDROCHLORIDE 12.5 MG: 25 INJECTION INTRAMUSCULAR; INTRAVENOUS at 08:06

## 2022-06-19 RX ADMIN — ENOXAPARIN SODIUM 40 MG: 40 INJECTION SUBCUTANEOUS at 06:06

## 2022-06-19 RX ADMIN — ACETAMINOPHEN 650 MG: 325 TABLET ORAL at 12:06

## 2022-06-19 RX ADMIN — HYDROMORPHONE HYDROCHLORIDE 1 MG: 2 INJECTION INTRAMUSCULAR; INTRAVENOUS; SUBCUTANEOUS at 06:06

## 2022-06-19 RX ADMIN — NICOTINE 1 PATCH: 21 PATCH, EXTENDED RELEASE TRANSDERMAL at 08:06

## 2022-06-19 RX ADMIN — METHOCARBAMOL 1000 MG: 100 INJECTION, SOLUTION INTRAMUSCULAR; INTRAVENOUS at 02:06

## 2022-06-19 RX ADMIN — HYDROMORPHONE HYDROCHLORIDE 1 MG: 2 INJECTION INTRAMUSCULAR; INTRAVENOUS; SUBCUTANEOUS at 10:06

## 2022-06-19 RX ADMIN — PANTOPRAZOLE SODIUM 40 MG: 40 INJECTION, POWDER, FOR SOLUTION INTRAVENOUS at 08:06

## 2022-06-19 RX ADMIN — ONDANSETRON 4 MG: 2 INJECTION INTRAMUSCULAR; INTRAVENOUS at 02:06

## 2022-06-19 RX ADMIN — HYDROMORPHONE HYDROCHLORIDE 1 MG: 2 INJECTION INTRAMUSCULAR; INTRAVENOUS; SUBCUTANEOUS at 05:06

## 2022-06-19 RX ADMIN — HYDROMORPHONE HYDROCHLORIDE 1 MG: 2 INJECTION INTRAMUSCULAR; INTRAVENOUS; SUBCUTANEOUS at 01:06

## 2022-06-19 RX ADMIN — PROMETHAZINE HYDROCHLORIDE 12.5 MG: 25 INJECTION INTRAMUSCULAR; INTRAVENOUS at 06:06

## 2022-06-19 RX ADMIN — ONDANSETRON 4 MG: 2 INJECTION INTRAMUSCULAR; INTRAVENOUS at 05:06

## 2022-06-19 RX ADMIN — POTASSIUM CHLORIDE, DEXTROSE MONOHYDRATE AND SODIUM CHLORIDE: 150; 5; 450 INJECTION, SOLUTION INTRAVENOUS at 06:06

## 2022-06-19 RX ADMIN — ACETAMINOPHEN 650 MG: 325 TABLET ORAL at 10:06

## 2022-06-19 RX ADMIN — HYDROMORPHONE HYDROCHLORIDE 1 MG: 2 INJECTION INTRAMUSCULAR; INTRAVENOUS; SUBCUTANEOUS at 02:06

## 2022-06-19 RX ADMIN — METHOCARBAMOL 1000 MG: 100 INJECTION, SOLUTION INTRAMUSCULAR; INTRAVENOUS at 05:06

## 2022-06-19 RX ADMIN — METHOCARBAMOL 1000 MG: 100 INJECTION, SOLUTION INTRAMUSCULAR; INTRAVENOUS at 10:06

## 2022-06-19 NOTE — ASSESSMENT & PLAN NOTE
-likely migraine (but patient states this is worse than her typical migraine)  --CT shows No acute findings.  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  Unchanged mildly prominent perivascular spaces within the basal ganglia  --PRN analgesia

## 2022-06-19 NOTE — H&P
[facility]  History & Physical    Patient Name: Rosario Lebron  MRN: [id number]  Patient Class: IP- Inpatient  Admission Date: 6/18/2022  Attending Physician: Vivienne Lopes MD   Primary Care Provider: Shannon Clemnets MD         Patient information was obtained from patient, spouse/SO, past medical records and ER records.     Subjective:     Principal Problem:Headache    Chief Complaint:   Chief Complaint   Patient presents with    Abdominal Pain     Pt CO diffused abd pain x1 day. CO N/V no D. Pos GI hx        HPI: Ms. Lebron is a 52 y/o female admitted to surgery service for recurrent SBO, and currently has NGT in place. She has h/o migraines and has known pelvic fracture and lumbar DDD. She has been receiving IV morphine in the hospital with no significant improvement. Hospital medicine consulted for further evaluation. States her headache is worse than her typical migraine.  at the bedside.      Past Medical History:   Diagnosis Date    PONV (postoperative nausea and vomiting)     SBO (small bowel obstruction) 6/18/2022       Past Surgical History:   Procedure Laterality Date    BLADDER REPAIR      CARPAL TUNNEL RELEASE Right 4/5/2021    Procedure: RELEASE, CARPAL TUNNEL;  Surgeon: Michael Frank MD;  Location: [facility] OR;  Service: Orthopedics;  Laterality: Right;    COLONOSCOPY N/A 5/19/2021    Procedure: COLONOSCOPY;  Surgeon: Emeka Pritchett MD;  Location: [facility];  Service: General;  Laterality: N/A;    EYE SURGERY Left     HERNIA REPAIR      SMALL INTESTINE SURGERY      TONSILLECTOMY      TUBAL LIGATION      URETHRA SURGERY         Review of patient's allergies indicates:   Allergen Reactions    Sulfa (sulfonamide antibiotics) Swelling     Lip swelling       Current Facility-Administered Medications on File Prior to Encounter   Medication    lactated ringers infusion    lactated ringers infusion    nozaseptin (NOZIN) nasal       Current Outpatient Medications on File Prior to Encounter   Medication Sig    amitriptyline (ELAVIL) 25 MG tablet Take 1 tablet (25 mg total) by mouth nightly as needed for Insomnia.    ascorbate calcium (JULIA-C ORAL) Take 1,000 mg by mouth once daily.    aspirin-acetaminophen-caffeine 250-250-65 mg (EXCEDRIN MIGRAINE) 250-250-65 mg per tablet Take 3 tablets by mouth every 4 to 6 hours as needed for Pain.    cyclobenzaprine (FLEXERIL) 10 MG tablet TAKE 1/2 TO 1 (ONE-HALF TO ONE) TABLET BY MOUTH TWICE DAILY AS NEEDED FOR MUSCLE SPASM MAY CAUSE DROWSINESS    gabapentin (NEURONTIN) 300 MG capsule Take 1 capsule (300 mg total) by mouth 2 (two) times daily as needed (pain).    inulin (FIBER GUMMIES ORAL) Take 1 Piece by mouth once daily.    multivitamin capsule Take 1 capsule by mouth once daily.    omeprazole (PRILOSEC) 40 MG capsule Take 1 capsule (40 mg total) by mouth once daily.    polyethylene glycol (GLYCOLAX) 17 gram/dose powder Take 17 g by mouth nightly.     Family History       Problem Relation (Age of Onset)    Prostate cancer Father          Tobacco Use    Smoking status: Current Every Day Smoker     Packs/day: 1.00     Years: 10.00     Pack years: 10.00     Types: Cigarettes    Smokeless tobacco: Never Used   Substance and Sexual Activity    Alcohol use: Not Currently    Drug use: Never    Sexual activity: Yes     Partners: Male     Review of Systems   Constitutional: Negative.  Negative for chills and fever.   HENT: Negative.  Negative for congestion, rhinorrhea, sore throat and trouble swallowing.    Eyes: Negative.  Negative for visual disturbance.   Respiratory: Negative.  Negative for cough, shortness of breath and wheezing.    Cardiovascular: Negative.  Negative for chest pain and palpitations.   Gastrointestinal:  Positive for abdominal pain and nausea. Negative for diarrhea and vomiting.   Endocrine: Negative.    Genitourinary:  Positive for pelvic pain. Negative for dysuria and  flank pain.   Musculoskeletal:  Positive for back pain.   Skin: Negative.  Negative for rash.   Allergic/Immunologic: Negative.    Neurological:  Positive for headaches. Negative for speech difficulty, weakness and numbness.   Hematological: Negative.    Psychiatric/Behavioral:  Positive for sleep disturbance. Negative for hallucinations. The patient is not nervous/anxious.    All other systems reviewed and are negative.  Objective:     Vital Signs (Most Recent):  Temp: 96.3 °F (35.7 °C) (06/19/22 0018)  Pulse: 110 (06/19/22 0018)  Resp: 18 (06/19/22 0018)  BP: 117/68 (06/19/22 0018)  SpO2: 95 % (06/19/22 0018) Vital Signs (24h Range):  Temp:  [96.3 °F (35.7 °C)-100.3 °F (37.9 °C)] 96.3 °F (35.7 °C)  Pulse:  [] 110  Resp:  [16-28] 18  SpO2:  [94 %-98 %] 95 %  BP: (110-143)/(67-90) 117/68     Weight: 79.7 kg (175 lb 11.3 oz)  Body mass index is 30.16 kg/m².    Physical Exam  Vitals and nursing note reviewed.   Constitutional:       General: She is awake.      Appearance: She is ill-appearing.      Comments: Has wet towel on forehead   HENT:      Head: Normocephalic and atraumatic.      Mouth/Throat:      Mouth: Mucous membranes are moist.   Eyes:      General: No scleral icterus.     Conjunctiva/sclera: Conjunctivae normal.   Cardiovascular:      Rate and Rhythm: Normal rate and regular rhythm.      Heart sounds: No murmur heard.  Pulmonary:      Effort: Pulmonary effort is normal. No respiratory distress.      Breath sounds: No wheezing.   Abdominal:      Palpations: Abdomen is soft.      Tenderness: There is no guarding or rebound.      Comments: NGT in place. Decreased bowel sounds   Musculoskeletal:         General: No swelling.      Cervical back: Normal range of motion and neck supple.   Skin:     General: Skin is warm.      Coloration: Skin is not jaundiced.   Neurological:      General: No focal deficit present.      Mental Status: She is alert and oriented to person, place, and time. Mental status is  at baseline.   Psychiatric:         Attention and Perception: She is inattentive.           Significant Labs: All pertinent labs within the past 24 hours have been reviewed.  Recent Lab Results         06/18/22  0924   06/18/22  0632   06/18/22  0621        Albumin     4.1       Alkaline Phosphatase     53       ALT     17       Anion Gap     10       Appearance, UA   Clear         AST     21       Baso #     0.06       Basophil %     0.5       Bilirubin (UA)   Negative         BILIRUBIN TOTAL     0.2  Comment: For infants and newborns, interpretation of results should be based  on gestational age, weight and in agreement with clinical  observations.    Premature Infant recommended reference ranges:  Up to 24 hours.............<8.0 mg/dL  Up to 48 hours............<12.0 mg/dL  3-5 days..................<15.0 mg/dL  6-29 days.................<15.0 mg/dL         BUN     18       Calcium     9.6       Chloride     104       CO2     22       Color, UA   Yellow         Creatinine     0.7       Differential Method     Automated       eGFR if      >60       eGFR if non      >60  Comment: Calculation used to obtain the estimated glomerular filtration  rate (eGFR) is the CKD-EPI equation.          Eos #     0.0       Eosinophil %     0.3       Glucose     129       Glucose, UA   Negative         Gran # (ANC)     10.4       Gran %     86.2       Hematocrit     42.3       Hemoglobin     14.4       Immature Grans (Abs)     0.05  Comment: Mild elevation in immature granulocytes is non specific and   can be seen in a variety of conditions including stress response,   acute inflammation, trauma and pregnancy. Correlation with other   laboratory and clinical findings is essential.         Immature Granulocytes     0.4       Ketones, UA   Negative         Leukocytes, UA   Negative         Lipase     23       Lymph #     0.6       Lymph %     5.1       MCH     31.7       MCHC     34.0       MCV      93       Mono #     0.9       Mono %     7.5       MPV     9.5       NITRITE UA   Negative         nRBC     0       Occult Blood UA   Trace         pH, UA   7.0         Platelets     396       Potassium     4.9       Preg Test, Ur   Negative         PROTEIN TOTAL     7.3       Protein, UA   Negative  Comment: Recommend a 24 hour urine protein or a urine   protein/creatinine ratio if globulin induced proteinuria is  clinically suspected.            Acceptable Yes           RBC     4.54       RDW     11.9       SARS-CoV-2 RNA, Amplification, Qual Negative           Sodium     136       Specific Gravity, UA   <=1.005         Specimen UA   Urine, Clean Catch         UROBILINOGEN UA   Negative         WBC     12.04               Significant Imaging: I have reviewed all pertinent imaging results/findings within the past 24 hours.  I have reviewed and interpreted all pertinent imaging results/findings within the past 24 hours.    Imaging Results              X-Ray Chest AP Portable (Final result)  Result time 06/18/22 09:52:20      Final result by Ricardo Shay MD (06/18/22 09:52:20)                   Impression:      1. Nasogastric tube placement.  Distal tip proximal body of stomach, good position.  2. Discoid atelectasis right lower lung.      Electronically signed by: Ricardo Shay  Date:    06/18/2022  Time:    09:52               Narrative:    EXAMINATION:  XR CHEST AP PORTABLE    CLINICAL HISTORY:  Encounter for other specified special examinations    COMPARISON:  11/23/2021    FINDINGS:  Mild discoid atelectasis right lower lung.  Left lung clear.  Heart size within normal limits.Nasogastric tube in place.  Distal tip overlies the proximal body of the stomach.  No significant bony findings.                                       CT Abdomen Pelvis  Without Contrast (Final result)  Result time 06/18/22 07:29:16      Final result by Ricardo Shay MD (06/18/22 07:29:16)                    Impression:      1. Findings worrisome for early small bowel obstruction or partial small bowel obstruction.  New since prior exam 05/17/2021.  See above for details.  2. Previous right ovarian cyst resolved.  All CT scans at this facility are performed  using dose modulation techniques as appropriate to performed exam including the following:  automated exposure control; adjustment of mA and/or kV according to the patients size (this includes techniques or standardized protocols for targeted exams where dose is matched to indication/reason for exam: i.e. extremities or head);  iterative reconstruction technique.      Electronically signed by: Ricardo Shay  Date:    06/18/2022  Time:    07:29               Narrative:    EXAMINATION:  CT ABDOMEN PELVIS WITHOUT CONTRAST    CLINICAL HISTORY:  Bowel obstruction suspected;. Abdominal pain.  Nausea and vomiting.    TECHNIQUE:  Low dose axial images, sagittal and coronal reformations were obtained from the lung bases to the pubic symphysis.    COMPARISON:  05/17/2021    FINDINGS:  Minimal atelectasis posterior right lung base.  Dependent atelectasis posterior left lung base.  Visualized heart normal in size.    The liver is normal.  The gallbladder is normal.    Normal spleen.  Normal pancreas.  Mild thickening medial limb left adrenal gland.  Low-density nodule medial limb right adrenal gland with Hounsfield units measuring -4, consistent with an adenoma (13 mm).  No change since prior exam.  Aorta and iliac vasculature normal in caliber with atherosclerotic calcification.    The left kidney is normal.  Left ureter is normal.  Minimal prominence of the right renal pelvis and proximal ureter but no hydronephrosis.  No obstructing lesion.    Stomach is distended with fluid, worse since prior exam.  Within the lower abdomen are new since the prior examination.  Collapse normal caliber ileal loops.  Findings worrisome for early or partial small bowel obstruction.   Normal appendix.  Dilated fluid-filled loops of small bowel the colon is normal.  The rectum is normal.    The pelvis demonstrates distended, normal bladder.  Uterus normal.  No adnexal masses.  Previous right ovarian cyst resolved.    No significant abnormality of the abdominopelvic wall soft tissues.  Grade 2 isthmic spondylolisthesis L4-L5, unchanged since the prior exam.  No suspicious osseous lesions.                                      I have independently reviewed all pertinent labs within the past 24 hours.    I have independently reviewed, visualized and interpreted all pertinent imaging results within the past 24 hours        Assessment/Plan:     * Headache  -likely migraine (but patient states this is worse than her typical migraine)  -CT head to rule out other etiology  -change IV morphine to Dilaudid      SBO (small bowel obstruction)  -defer further management to primary team        VTE Risk Mitigation (From admission, onward)         Ordered     IP VTE HIGH RISK PATIENT  Once         06/18/22 1605     Place sequential compression device  Until discontinued         06/18/22 1605     Place VIKTOR hose  Until discontinued         06/18/22 1605     enoxaparin injection 40 mg  Daily         06/18/22 1332               Will follow    Uli Acosta MD  Department of Hospital Medicine   [facility]

## 2022-06-19 NOTE — PROGRESS NOTES
[facility] [place] Medicine  Progress Note    Patient Name: Rosario Lebron  MRN: [id number]  Patient Class: IP- Inpatient   Admission Date: 6/18/2022  Length of Stay: 1 days  Attending Physician: Vivienne Lopes MD  Primary Care Provider: Shannon Clements MD        Subjective:     Principal Problem:Headache        HPI:  Ms. Lebron is a 50 y/o female admitted to surgery service for recurrent SBO, and currently has NGT in place. She has h/o migraines and has known pelvic fracture and lumbar DDD. She has been receiving IV morphine in the hospital with no significant improvement. Hospital medicine consulted for further evaluation. States her headache is worse than her typical migraine.  at the bedside.      Overview/Hospital Course:  Hospital medicine was consulted for medical management.      Interval History: Patient sleeping. CT head shows No acute findings.  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  Unchanged mildly prominent perivascular spaces within the basal ganglia.    NGT to suction with large amount of drainage - brown with some areas of purulence. Gen Surgery following.    Review of Systems   Constitutional: Negative.  Negative for chills and fever.   HENT: Negative.  Negative for congestion, rhinorrhea, sore throat and trouble swallowing.    Eyes:  Negative for visual disturbance.   Respiratory: Negative.  Negative for cough, shortness of breath and wheezing.    Cardiovascular: Negative.  Negative for chest pain and palpitations.   Gastrointestinal:  Positive for abdominal pain and nausea. Negative for diarrhea and vomiting.   Endocrine: Negative.    Genitourinary:  Positive for pelvic pain. Negative for dysuria and flank pain.   Musculoskeletal:  Positive for back pain.   Skin: Negative.  Negative for rash.   Allergic/Immunologic: Negative.    Neurological:  Positive for headaches. Negative for speech difficulty, weakness and numbness.   Hematological:  Negative.    Psychiatric/Behavioral:  Positive for sleep disturbance. Negative for agitation, behavioral problems and hallucinations. The patient is not nervous/anxious.    All other systems reviewed and are negative.  Objective:     Vital Signs (Most Recent):  Temp: 100.3 °F (37.9 °C) (06/19/22 1203)  Pulse: 91 (06/19/22 1203)  Resp: 16 (06/19/22 1412)  BP: 117/72 (06/19/22 1203)  SpO2: (!) 92 % (06/19/22 1203)   Vital Signs (24h Range):  Temp:  [96.3 °F (35.7 °C)-100.3 °F (37.9 °C)] 100.3 °F (37.9 °C)  Pulse:  [] 91  Resp:  [14-18] 16  SpO2:  [91 %-95 %] 92 %  BP: (114-139)/(68-86) 117/72     Weight: 79.7 kg (175 lb 11.3 oz)  Body mass index is 30.16 kg/m².    Intake/Output Summary (Last 24 hours) at 6/19/2022 1436  Last data filed at 6/19/2022 0700  Gross per 24 hour   Intake 1770.54 ml   Output 350 ml   Net 1420.54 ml      Physical Exam  Vitals and nursing note reviewed.   Constitutional:       General: She is awake.      Appearance: She is ill-appearing.      Comments: Has wet towel on forehead   HENT:      Head: Normocephalic and atraumatic.      Mouth/Throat:      Mouth: Mucous membranes are moist.   Eyes:      General: No scleral icterus.     Conjunctiva/sclera: Conjunctivae normal.   Cardiovascular:      Rate and Rhythm: Normal rate and regular rhythm.      Heart sounds: No murmur heard.  Pulmonary:      Effort: Pulmonary effort is normal. No respiratory distress.      Breath sounds: No wheezing.   Abdominal:      Palpations: Abdomen is soft.      Tenderness: There is no guarding or rebound.      Comments: NGT in place. Decreased bowel sounds   Genitourinary:     Comments: deferred  Musculoskeletal:         General: No swelling.      Cervical back: Normal range of motion and neck supple.   Skin:     General: Skin is warm.      Capillary Refill: Capillary refill takes 2 to 3 seconds.      Coloration: Skin is not jaundiced.   Neurological:      General: No focal deficit present.      Mental Status: She  is alert and oriented to person, place, and time. Mental status is at baseline.   Psychiatric:         Attention and Perception: She is inattentive.      Assistance with smoking cessation was offered, including:  [x]  Medications  [x]  Counseling  []  Printed Information on Smoking Cessation  []  Referral to a Smoking Cessation Program    Patient was counseled regarding smoking for 3-10 minutes.     Significant Labs: All pertinent labs within the past 24 hours have been reviewed.  Recent Lab Results         06/19/22  0610        Albumin 3.5       Alkaline Phosphatase 46       ALT 15       Anion Gap 8       AST 16       Baso # 0.01       Basophil % 0.1       BILIRUBIN TOTAL 0.1  Comment: For infants and newborns, interpretation of results should be based  on gestational age, weight and in agreement with clinical  observations.    Premature Infant recommended reference ranges:  Up to 24 hours.............<8.0 mg/dL  Up to 48 hours............<12.0 mg/dL  3-5 days..................<15.0 mg/dL  6-29 days.................<15.0 mg/dL         BUN 12       Calcium 8.6       Chloride 107       CO2 23       Creatinine 0.7       Differential Method Automated       eGFR if  >60       eGFR if non  >60  Comment: Calculation used to obtain the estimated glomerular filtration  rate (eGFR) is the CKD-EPI equation.          Eos # 0.0       Eosinophil % 0.0       Glucose 130       Gran # (ANC) 6.9       Gran % 87.0       Hematocrit 40.0       Hemoglobin 12.9       Immature Grans (Abs) 0.03  Comment: Mild elevation in immature granulocytes is non specific and   can be seen in a variety of conditions including stress response,   acute inflammation, trauma and pregnancy. Correlation with other   laboratory and clinical findings is essential.         Immature Granulocytes 0.4       Lymph # 0.3       Lymph % 3.4       MCH 31.2       MCHC 32.3       MCV 97       Mono # 0.7       Mono % 9.1       MPV 9.5        nRBC 0       Platelets 304       Potassium 4.8       PROTEIN TOTAL 6.6       RBC 4.13       RDW 12.4       Sodium 138       WBC 7.89               Significant Imaging: I have reviewed all pertinent imaging results/findings within the past 24 hours.      Assessment/Plan:      * Headache  -likely migraine (but patient states this is worse than her typical migraine)  --CT shows No acute findings.  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  Unchanged mildly prominent perivascular spaces within the basal ganglia  --PRN analgesia      SBO (small bowel obstruction)  -defer further management to primary team      Tobacco abuse  --cessation counseling      Acid reflux  --IV pantoprazole        VTE Risk Mitigation (From admission, onward)         Ordered     IP VTE HIGH RISK PATIENT  Once         06/18/22 1605     Place sequential compression device  Until discontinued         06/18/22 1605     Place VIKTOR hose  Until discontinued         06/18/22 1605     enoxaparin injection 40 mg  Daily         06/18/22 1332                Discharge Planning   AMRIT:      Code Status: Full Code   Is the patient medically ready for discharge?:     Reason for patient still in hospital (select all that apply): Patient trending condition, Treatment and Consult recommendations  Discharge Plan A: Home                  SHARIF CerdaC  Department of Hospital Medicine   [facility]

## 2022-06-19 NOTE — HPI
51-year-old white female with history of small-bowel obstruction presenting with crampy abdominal pain that is generalized onset  yesterday.  She has had a bowel movement yesterday but none today.  Patient does note some nausea vomiting but no diarrhea.  No fevers or chills.  She denies any urinary complaints. history abdominal surgery for her bladder, small bowel obstruction and hernia repair.  She does smoke.     Patient complaining of a severe headache.  Has a history of migraines.  Also complaining of sore throat from the NG tube.  States her abdominal pain is improved.  Still no flatus or bowel movement today.   is by her side.  NG tube since placement put out around 200. Patient has history of multiple small-bowel obstructions.  One of them did require exploratory laparotomy but sounds like it may have been caused from a hernia.  Patient has had multiple problems since a trauma resulted in fractured pelvis and bladder rupture.  Multiple abdominal surgeries including bladder repair, exploratory lap for trauma, exploratory lap for bowel obstruction, hernia repair, bilateral tubal ligation.  She has also had eye surgery.  Awaiting to have back surgery.  Patient states she smokes daily.  At least a pack a day.  No alcohol use.  Mother and father are medically healthy.

## 2022-06-19 NOTE — ASSESSMENT & PLAN NOTE
-likely migraine (but patient states this is worse than her typical migraine)  -CT head to rule out other etiology  -change IV morphine to Dilaudid

## 2022-06-19 NOTE — HOSPITAL COURSE
Hospital medicine was consulted for medical management. Patient currently sleeping. CT head shows No acute findings.  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  Unchanged mildly prominent perivascular spaces within the basal ganglia.   NGT to suction with large amount of drainage - brown with some areas of purulence. Gen Surgery following.  06/20: SB follow through today. Patient continues with high pain levels - continue PRN analgesia and awaiting further recs from surgery.  06/21: SBFT showed normal transit times. NGT pulled and pt started on CLD. Also given IV reglan 10mg q 6 hours. Patient tolerated CLD with no N/V or abd pain. Diet was advanced and patient tolerated without difficulties. She is having multiple BM's. Patient high risk for readmission. Referred to NP at home program for ongoing management, to increase compliance, and decrease readmission. Patient was seen and examined today and deemed stable for discharge home.

## 2022-06-19 NOTE — CONSULTS
[facility] Medicine  Consult Note    Patient Name: Rosario Lebron  MRN: [id number]  Admission Date: 6/18/2022  Hospital Length of Stay: 1 days  Attending Physician: Vivienne Lopes MD   Primary Care Provider: Shannon Clements MD           Patient information was obtained from patient, spouse/SO, past medical records and ER records.     Consults  Subjective:     Principal Problem: Headache    Chief Complaint:   Chief Complaint   Patient presents with    Abdominal Pain     Pt CO diffused abd pain x1 day. CO N/V no D. Pos GI hx        HPI: Ms. Lebron is a 50 y/o female admitted to surgery service for recurrent SBO, and currently has NGT in place. She has h/o migraines and has known pelvic fracture and lumbar DDD. She has been receiving IV morphine in the hospital with no significant improvement. Hospital medicine consulted for further evaluation. States her headache is worse than her typical migraine.  at the bedside.      Past Medical History:   Diagnosis Date    PONV (postoperative nausea and vomiting)     SBO (small bowel obstruction) 6/18/2022       Past Surgical History:   Procedure Laterality Date    BLADDER REPAIR      CARPAL TUNNEL RELEASE Right 4/5/2021    Procedure: RELEASE, CARPAL TUNNEL;  Surgeon: Michael Frank MD;  Location: [facility] OR;  Service: Orthopedics;  Laterality: Right;    COLONOSCOPY N/A 5/19/2021    Procedure: COLONOSCOPY;  Surgeon: Emeka Pritchett MD;  Location: [facility];  Service: General;  Laterality: N/A;    EYE SURGERY Left     HERNIA REPAIR      SMALL INTESTINE SURGERY      TONSILLECTOMY      TUBAL LIGATION      URETHRA SURGERY         Review of patient's allergies indicates:   Allergen Reactions    Sulfa (sulfonamide antibiotics) Swelling     Lip swelling       Current Facility-Administered Medications on File Prior to Encounter   Medication    lactated ringers infusion    lactated ringers infusion    nozaseptin (NOZIN) nasal  sanitizer     Current Outpatient Medications on File Prior to Encounter   Medication Sig    amitriptyline (ELAVIL) 25 MG tablet Take 1 tablet (25 mg total) by mouth nightly as needed for Insomnia.    ascorbate calcium (JULIA-C ORAL) Take 1,000 mg by mouth once daily.    aspirin-acetaminophen-caffeine 250-250-65 mg (EXCEDRIN MIGRAINE) 250-250-65 mg per tablet Take 3 tablets by mouth every 4 to 6 hours as needed for Pain.    cyclobenzaprine (FLEXERIL) 10 MG tablet TAKE 1/2 TO 1 (ONE-HALF TO ONE) TABLET BY MOUTH TWICE DAILY AS NEEDED FOR MUSCLE SPASM MAY CAUSE DROWSINESS    gabapentin (NEURONTIN) 300 MG capsule Take 1 capsule (300 mg total) by mouth 2 (two) times daily as needed (pain).    inulin (FIBER GUMMIES ORAL) Take 1 Piece by mouth once daily.    multivitamin capsule Take 1 capsule by mouth once daily.    omeprazole (PRILOSEC) 40 MG capsule Take 1 capsule (40 mg total) by mouth once daily.    polyethylene glycol (GLYCOLAX) 17 gram/dose powder Take 17 g by mouth nightly.     Family History       Problem Relation (Age of Onset)    Prostate cancer Father          Tobacco Use    Smoking status: Current Every Day Smoker     Packs/day: 1.00     Years: 10.00     Pack years: 10.00     Types: Cigarettes    Smokeless tobacco: Never Used   Substance and Sexual Activity    Alcohol use: Not Currently    Drug use: Never    Sexual activity: Yes     Partners: Male     Review of Systems   Constitutional: Negative.  Negative for chills and fever.   HENT: Negative.  Negative for congestion, rhinorrhea, sore throat and trouble swallowing.    Eyes: Negative.  Negative for visual disturbance.   Respiratory: Negative.  Negative for cough, shortness of breath and wheezing.    Cardiovascular: Negative.  Negative for chest pain and palpitations.   Gastrointestinal:  Positive for abdominal pain and nausea. Negative for diarrhea and vomiting.   Endocrine: Negative.    Genitourinary:  Positive for pelvic pain. Negative for  dysuria and flank pain.   Musculoskeletal:  Positive for back pain.   Skin: Negative.  Negative for rash.   Allergic/Immunologic: Negative.    Neurological:  Positive for headaches. Negative for speech difficulty, weakness and numbness.   Hematological: Negative.    Psychiatric/Behavioral:  Positive for sleep disturbance. Negative for hallucinations. The patient is not nervous/anxious.    All other systems reviewed and are negative.  Objective:     Vital Signs (Most Recent):  Temp: 96.3 °F (35.7 °C) (06/19/22 0018)  Pulse: 110 (06/19/22 0018)  Resp: 18 (06/19/22 0018)  BP: 117/68 (06/19/22 0018)  SpO2: 95 % (06/19/22 0018) Vital Signs (24h Range):  Temp:  [96.3 °F (35.7 °C)-100.3 °F (37.9 °C)] 96.3 °F (35.7 °C)  Pulse:  [] 110  Resp:  [16-28] 18  SpO2:  [94 %-98 %] 95 %  BP: (110-143)/(67-90) 117/68     Weight: 79.7 kg (175 lb 11.3 oz)  Body mass index is 30.16 kg/m².    Physical Exam  Vitals and nursing note reviewed.   Constitutional:       General: She is awake.      Appearance: She is ill-appearing.      Comments: Has wet towel on forehead   HENT:      Head: Normocephalic and atraumatic.      Mouth/Throat:      Mouth: Mucous membranes are moist.   Eyes:      General: No scleral icterus.     Conjunctiva/sclera: Conjunctivae normal.   Cardiovascular:      Rate and Rhythm: Normal rate and regular rhythm.      Heart sounds: No murmur heard.  Pulmonary:      Effort: Pulmonary effort is normal. No respiratory distress.      Breath sounds: No wheezing.   Abdominal:      Palpations: Abdomen is soft.      Tenderness: There is no guarding or rebound.      Comments: NGT in place. Decreased bowel sounds   Musculoskeletal:         General: No swelling.      Cervical back: Normal range of motion and neck supple.   Skin:     General: Skin is warm.      Coloration: Skin is not jaundiced.   Neurological:      General: No focal deficit present.      Mental Status: She is alert and oriented to person, place, and time. Mental  status is at baseline.   Psychiatric:         Attention and Perception: She is inattentive.       Significant Labs: All pertinent labs within the past 24 hours have been reviewed.  Recent Lab Results         06/18/22  0924   06/18/22  0632   06/18/22  0621        Albumin     4.1       Alkaline Phosphatase     53       ALT     17       Anion Gap     10       Appearance, UA   Clear         AST     21       Baso #     0.06       Basophil %     0.5       Bilirubin (UA)   Negative         BILIRUBIN TOTAL     0.2  Comment: For infants and newborns, interpretation of results should be based  on gestational age, weight and in agreement with clinical  observations.    Premature Infant recommended reference ranges:  Up to 24 hours.............<8.0 mg/dL  Up to 48 hours............<12.0 mg/dL  3-5 days..................<15.0 mg/dL  6-29 days.................<15.0 mg/dL         BUN     18       Calcium     9.6       Chloride     104       CO2     22       Color, UA   Yellow         Creatinine     0.7       Differential Method     Automated       eGFR if      >60       eGFR if non      >60  Comment: Calculation used to obtain the estimated glomerular filtration  rate (eGFR) is the CKD-EPI equation.          Eos #     0.0       Eosinophil %     0.3       Glucose     129       Glucose, UA   Negative         Gran # (ANC)     10.4       Gran %     86.2       Hematocrit     42.3       Hemoglobin     14.4       Immature Grans (Abs)     0.05  Comment: Mild elevation in immature granulocytes is non specific and   can be seen in a variety of conditions including stress response,   acute inflammation, trauma and pregnancy. Correlation with other   laboratory and clinical findings is essential.         Immature Granulocytes     0.4       Ketones, UA   Negative         Leukocytes, UA   Negative         Lipase     23       Lymph #     0.6       Lymph %     5.1       MCH     31.7       MCHC     34.0       MCV      93       Mono #     0.9       Mono %     7.5       MPV     9.5       NITRITE UA   Negative         nRBC     0       Occult Blood UA   Trace         pH, UA   7.0         Platelets     396       Potassium     4.9       Preg Test, Ur   Negative         PROTEIN TOTAL     7.3       Protein, UA   Negative  Comment: Recommend a 24 hour urine protein or a urine   protein/creatinine ratio if globulin induced proteinuria is  clinically suspected.            Acceptable Yes           RBC     4.54       RDW     11.9       SARS-CoV-2 RNA, Amplification, Qual Negative           Sodium     136       Specific Gravity, UA   <=1.005         Specimen UA   Urine, Clean Catch         UROBILINOGEN UA   Negative         WBC     12.04               Significant Imaging: I have reviewed all pertinent imaging results/findings within the past 24 hours.  I have reviewed and interpreted all pertinent imaging results/findings within the past 24 hours.    Assessment/Plan:     * Headache  -likely migraine (but patient states this is worse than her typical migraine)  -CT head to rule out other etiology  -change IV morphine to Dilaudid      SBO (small bowel obstruction)  -defer further management to primary team        VTE Risk Mitigation (From admission, onward)         Ordered     IP VTE HIGH RISK PATIENT  Once         06/18/22 1605     Place sequential compression device  Until discontinued         06/18/22 1605     Place VIKTOR hose  Until discontinued         06/18/22 1605     enoxaparin injection 40 mg  Daily         06/18/22 1332                    Thank you for your consult. We will follow-up with patient. Please contact us if you have any additional questions.    Uli Acosta MD  Department of Hospital Medicine   [facility]

## 2022-06-19 NOTE — PLAN OF CARE
Problem: Adult Inpatient Plan of Care  Goal: Plan of Care Review  Outcome: Ongoing, Progressing  Patient had no adverse events during shift. Patient free of falls. Call light in reach. Side Rails x2. Pain controlled with ordered medications. Patient repositions independently. IVF/ABX administered as ordered. VSS. NG tube in place connected to LIWS. Pt tolerated CT of head well. Fall risk precautions in place. Cool packs/wsh cloths applied to manage migraine pain. Chart reviewed. Will Continue to monitor.

## 2022-06-19 NOTE — SUBJECTIVE & OBJECTIVE
Interval History: 6/19/2022 admitted with severe HA and recurrent SBO. Consulted hospitalist last evening since pt stated her HA was the most severe HA she has ever experienced. CT of head was negative. Pt states her abd does not hurt. Still with thick brown NGT aspirate. Abd remains mildly distended but soft and nontender. No flatus or BM.  AF VSS. Pt more alert and talkative this afternoon. Will consider SBFT tomorrow. Pt declined today secondary to HA. Wbc wnl, elec ok    Medications:  Continuous Infusions:   dextrose 5 % and 0.45 % NaCl with KCl 20 mEq 125 mL/hr at 06/19/22 0931     Scheduled Meds:   enoxaparin  40 mg Subcutaneous Daily    methocarbamoL  1,000 mg Intravenous Q8H    nicotine  1 patch Transdermal Daily    pantoprazole  40 mg Intravenous Daily     PRN Meds:acetaminophen, acetaminophen, HYDROmorphone, melatonin, ondansetron, promethazine (PHENERGAN) IVPB, sodium chloride 0.9%     Review of patient's allergies indicates:   Allergen Reactions    Sulfa (sulfonamide antibiotics) Swelling     Lip swelling     Objective:     Vital Signs (Most Recent):  Temp: 98.9 °F (37.2 °C) (06/19/22 1531)  Pulse: 93 (06/19/22 1531)  Resp: 18 (06/19/22 1531)  BP: 113/73 (06/19/22 1531)  SpO2: (!) 90 % (06/19/22 1531)   Vital Signs (24h Range):  Temp:  [96.3 °F (35.7 °C)-100.3 °F (37.9 °C)] 98.9 °F (37.2 °C)  Pulse:  [] 93  Resp:  [14-18] 18  SpO2:  [90 %-95 %] 90 %  BP: (113-139)/(68-84) 113/73     Weight: 79.7 kg (175 lb 11.3 oz)  Body mass index is 30.16 kg/m².    Intake/Output - Last 3 Shifts         06/17 0700  06/18 0659 06/18 0700 06/19 0659 06/19 0700 06/20 0659    P.O.  0     I.V. (mL/kg)  1672.1 (21)     IV Piggyback  98.4     Total Intake(mL/kg)  1770.5 (22.2)     Urine (mL/kg/hr)  0 (0)     Drains  100 250    Total Output  100 250    Net  +1670.5 -250           Urine Occurrence  3 x     Emesis Occurrence  2 x             Physical Exam  Vitals reviewed.   Constitutional:       General: She is not in  acute distress.     Appearance: She is not ill-appearing.   HENT:      Nose:      Comments: NGT in place with thick brown aspirate     Mouth/Throat:      Mouth: Mucous membranes are dry.   Eyes:      Pupils: Pupils are equal, round, and reactive to light.   Cardiovascular:      Rate and Rhythm: Normal rate and regular rhythm.      Heart sounds: Normal heart sounds.   Pulmonary:      Effort: Pulmonary effort is normal.      Breath sounds: Normal breath sounds.   Abdominal:      General: Bowel sounds are normal. There is distension.      Palpations: Abdomen is soft.      Tenderness: There is no abdominal tenderness.   Musculoskeletal:         General: Normal range of motion.   Skin:     General: Skin is warm and dry.   Neurological:      General: No focal deficit present.      Mental Status: She is alert and oriented to person, place, and time.   Psychiatric:         Judgment: Judgment normal.       Significant Labs:  I have reviewed all pertinent lab results within the past 24 hours.  CBC:   Recent Labs   Lab 06/19/22  0610   WBC 7.89   RBC 4.13   HGB 12.9   HCT 40.0      MCV 97   MCH 31.2*   MCHC 32.3     BMP:   Recent Labs   Lab 06/19/22  0610   *      K 4.8      CO2 23   BUN 12   CREATININE 0.7   CALCIUM 8.6*     CMP:   Recent Labs   Lab 06/19/22  0610   *   CALCIUM 8.6*   ALBUMIN 3.5   PROT 6.6      K 4.8   CO2 23      BUN 12   CREATININE 0.7   ALKPHOS 46*   ALT 15   AST 16   BILITOT 0.1     LFTs:   Recent Labs   Lab 06/19/22  0610   ALT 15   AST 16   ALKPHOS 46*   BILITOT 0.1   PROT 6.6   ALBUMIN 3.5       Significant Diagnostics:  I have reviewed all pertinent imaging results/findings within the past 24 hours.

## 2022-06-19 NOTE — NURSING
Pt c/o 10/10 pain to head post admin of scheduled Robaxin at 2200 and Morphine IV push, Patient is actively vomiting and is not due for phenergan or zofran, notified Dr. Lopes, stated to give dose of phenergan now and to consult hospital medicine, consult for hosp med placed, Pt 100.3 temp decreased to 98.4 orally, Patient is sweating and does not look well. Pillows given for comfort, NG tube flushed with water to help with flow of thick drainage. Will continue to monitor.    Hosp Med. Dr. Acosta assessed patient, orders placed for STAT CT of head and pain medication changed. Pt tolerated CT well, placed back in bed, NG tube connected to LIWS. Will continue to monitor.

## 2022-06-19 NOTE — HPI
Ms. Lebron is a 50 y/o female admitted to surgery service for recurrent SBO, and currently has NGT in place. She has h/o migraines and has known pelvic fracture and lumbar DDD. She has been receiving IV morphine in the hospital with no significant improvement. Hospital medicine consulted for further evaluation. States her headache is worse than her typical migraine.  at the bedside.

## 2022-06-19 NOTE — SUBJECTIVE & OBJECTIVE
Interval History: Patient sleeping. CT head shows No acute findings.  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  Unchanged mildly prominent perivascular spaces within the basal ganglia.    NGT to suction with large amount of drainage - brown with some areas of purulence. Gen Surgery following.    Review of Systems   Constitutional: Negative.  Negative for chills and fever.   HENT: Negative.  Negative for congestion, rhinorrhea, sore throat and trouble swallowing.    Eyes:  Negative for visual disturbance.   Respiratory: Negative.  Negative for cough, shortness of breath and wheezing.    Cardiovascular: Negative.  Negative for chest pain and palpitations.   Gastrointestinal:  Positive for abdominal pain and nausea. Negative for diarrhea and vomiting.   Endocrine: Negative.    Genitourinary:  Positive for pelvic pain. Negative for dysuria and flank pain.   Musculoskeletal:  Positive for back pain.   Skin: Negative.  Negative for rash.   Allergic/Immunologic: Negative.    Neurological:  Positive for headaches. Negative for speech difficulty, weakness and numbness.   Hematological: Negative.    Psychiatric/Behavioral:  Positive for sleep disturbance. Negative for agitation, behavioral problems and hallucinations. The patient is not nervous/anxious.    All other systems reviewed and are negative.  Objective:     Vital Signs (Most Recent):  Temp: 100.3 °F (37.9 °C) (06/19/22 1203)  Pulse: 91 (06/19/22 1203)  Resp: 16 (06/19/22 1412)  BP: 117/72 (06/19/22 1203)  SpO2: (!) 92 % (06/19/22 1203)   Vital Signs (24h Range):  Temp:  [96.3 °F (35.7 °C)-100.3 °F (37.9 °C)] 100.3 °F (37.9 °C)  Pulse:  [] 91  Resp:  [14-18] 16  SpO2:  [91 %-95 %] 92 %  BP: (114-139)/(68-86) 117/72     Weight: 79.7 kg (175 lb 11.3 oz)  Body mass index is 30.16 kg/m².    Intake/Output Summary (Last 24 hours) at 6/19/2022 1436  Last data filed at 6/19/2022 0700  Gross per 24 hour   Intake 1770.54 ml   Output 350 ml   Net  1420.54 ml      Physical Exam  Vitals and nursing note reviewed.   Constitutional:       General: She is awake.      Appearance: She is ill-appearing.      Comments: Has wet towel on forehead   HENT:      Head: Normocephalic and atraumatic.      Mouth/Throat:      Mouth: Mucous membranes are moist.   Eyes:      General: No scleral icterus.     Conjunctiva/sclera: Conjunctivae normal.   Cardiovascular:      Rate and Rhythm: Normal rate and regular rhythm.      Heart sounds: No murmur heard.  Pulmonary:      Effort: Pulmonary effort is normal. No respiratory distress.      Breath sounds: No wheezing.   Abdominal:      Palpations: Abdomen is soft.      Tenderness: There is no guarding or rebound.      Comments: NGT in place. Decreased bowel sounds   Genitourinary:     Comments: deferred  Musculoskeletal:         General: No swelling.      Cervical back: Normal range of motion and neck supple.   Skin:     General: Skin is warm.      Capillary Refill: Capillary refill takes 2 to 3 seconds.      Coloration: Skin is not jaundiced.   Neurological:      General: No focal deficit present.      Mental Status: She is alert and oriented to person, place, and time. Mental status is at baseline.   Psychiatric:         Attention and Perception: She is inattentive.      Assistance with smoking cessation was offered, including:  [x]  Medications  [x]  Counseling  []  Printed Information on Smoking Cessation  []  Referral to a Smoking Cessation Program    Patient was counseled regarding smoking for 3-10 minutes.     Significant Labs: All pertinent labs within the past 24 hours have been reviewed.  Recent Lab Results         06/19/22  0610        Albumin 3.5       Alkaline Phosphatase 46       ALT 15       Anion Gap 8       AST 16       Baso # 0.01       Basophil % 0.1       BILIRUBIN TOTAL 0.1  Comment: For infants and newborns, interpretation of results should be based  on gestational age, weight and in agreement with  clinical  observations.    Premature Infant recommended reference ranges:  Up to 24 hours.............<8.0 mg/dL  Up to 48 hours............<12.0 mg/dL  3-5 days..................<15.0 mg/dL  6-29 days.................<15.0 mg/dL         BUN 12       Calcium 8.6       Chloride 107       CO2 23       Creatinine 0.7       Differential Method Automated       eGFR if  >60       eGFR if non  >60  Comment: Calculation used to obtain the estimated glomerular filtration  rate (eGFR) is the CKD-EPI equation.          Eos # 0.0       Eosinophil % 0.0       Glucose 130       Gran # (ANC) 6.9       Gran % 87.0       Hematocrit 40.0       Hemoglobin 12.9       Immature Grans (Abs) 0.03  Comment: Mild elevation in immature granulocytes is non specific and   can be seen in a variety of conditions including stress response,   acute inflammation, trauma and pregnancy. Correlation with other   laboratory and clinical findings is essential.         Immature Granulocytes 0.4       Lymph # 0.3       Lymph % 3.4       MCH 31.2       MCHC 32.3       MCV 97       Mono # 0.7       Mono % 9.1       MPV 9.5       nRBC 0       Platelets 304       Potassium 4.8       PROTEIN TOTAL 6.6       RBC 4.13       RDW 12.4       Sodium 138       WBC 7.89               Significant Imaging: I have reviewed all pertinent imaging results/findings within the past 24 hours.

## 2022-06-19 NOTE — HOSPITAL COURSE
6/19/2022 admitted with severe HA and recurrent SBO. Consulted hospitalist last evening since pt stated her HA was the most severe HA she has ever experienced. CT of head was negative. Pt states her abd does not hurt. Still with thick brown NGT aspirate. Abd remains mildly distended but soft and nontender. No flatus or BM.  AF VSS. Pt more alert and talkative this afternoon. Will consider SBFT tomorrow. Pt declined today secondary to HA.    06/20/2022.  Still complains of a headache and NG tube discomfort.  Less abdominal pain.  Reports a bowel movement.  Small-bowel follow-through with Gastrografin today    06/21/2022.  Mild headache.  Tolerated clear liquids to full liquids after small-bowel follow-through showed no obstructions.  Having bowel movements.  No abdominal pain today.  Regular diet at lunch anticipate discharge home

## 2022-06-19 NOTE — SUBJECTIVE & OBJECTIVE
Past Medical History:   Diagnosis Date    PONV (postoperative nausea and vomiting)     SBO (small bowel obstruction) 6/18/2022       Past Surgical History:   Procedure Laterality Date    BLADDER REPAIR      CARPAL TUNNEL RELEASE Right 4/5/2021    Procedure: RELEASE, CARPAL TUNNEL;  Surgeon: Michael Frank MD;  Location: [facility] OR;  Service: Orthopedics;  Laterality: Right;    COLONOSCOPY N/A 5/19/2021    Procedure: COLONOSCOPY;  Surgeon: Emeka Pritchett MD;  Location: [facility];  Service: General;  Laterality: N/A;    EYE SURGERY Left     HERNIA REPAIR      SMALL INTESTINE SURGERY      TONSILLECTOMY      TUBAL LIGATION      URETHRA SURGERY         Review of patient's allergies indicates:   Allergen Reactions    Sulfa (sulfonamide antibiotics) Swelling     Lip swelling       Current Facility-Administered Medications on File Prior to Encounter   Medication    lactated ringers infusion    lactated ringers infusion    nozaseptin (NOZIN) nasal      Current Outpatient Medications on File Prior to Encounter   Medication Sig    amitriptyline (ELAVIL) 25 MG tablet Take 1 tablet (25 mg total) by mouth nightly as needed for Insomnia.    ascorbate calcium (JULIA-C ORAL) Take 1,000 mg by mouth once daily.    aspirin-acetaminophen-caffeine 250-250-65 mg (EXCEDRIN MIGRAINE) 250-250-65 mg per tablet Take 3 tablets by mouth every 4 to 6 hours as needed for Pain.    cyclobenzaprine (FLEXERIL) 10 MG tablet TAKE 1/2 TO 1 (ONE-HALF TO ONE) TABLET BY MOUTH TWICE DAILY AS NEEDED FOR MUSCLE SPASM MAY CAUSE DROWSINESS    gabapentin (NEURONTIN) 300 MG capsule Take 1 capsule (300 mg total) by mouth 2 (two) times daily as needed (pain).    inulin (FIBER GUMMIES ORAL) Take 1 Piece by mouth once daily.    multivitamin capsule Take 1 capsule by mouth once daily.    omeprazole (PRILOSEC) 40 MG capsule Take 1 capsule (40 mg total) by mouth once daily.    polyethylene glycol (GLYCOLAX) 17 gram/dose powder Take 17 g by mouth  nightly.     Family History       Problem Relation (Age of Onset)    Prostate cancer Father          Tobacco Use    Smoking status: Current Every Day Smoker     Packs/day: 1.00     Years: 10.00     Pack years: 10.00     Types: Cigarettes    Smokeless tobacco: Never Used   Substance and Sexual Activity    Alcohol use: Not Currently    Drug use: Never    Sexual activity: Yes     Partners: Male     Review of Systems   Constitutional: Negative.  Negative for chills and fever.   HENT: Negative.  Negative for congestion, rhinorrhea, sore throat and trouble swallowing.    Eyes: Negative.  Negative for visual disturbance.   Respiratory: Negative.  Negative for cough, shortness of breath and wheezing.    Cardiovascular: Negative.  Negative for chest pain and palpitations.   Gastrointestinal:  Positive for abdominal pain and nausea. Negative for diarrhea and vomiting.   Endocrine: Negative.    Genitourinary:  Positive for pelvic pain. Negative for dysuria and flank pain.   Musculoskeletal:  Positive for back pain.   Skin: Negative.  Negative for rash.   Allergic/Immunologic: Negative.    Neurological:  Positive for headaches. Negative for speech difficulty, weakness and numbness.   Hematological: Negative.    Psychiatric/Behavioral:  Positive for sleep disturbance. Negative for hallucinations. The patient is not nervous/anxious.    All other systems reviewed and are negative.  Objective:     Vital Signs (Most Recent):  Temp: 96.3 °F (35.7 °C) (06/19/22 0018)  Pulse: 110 (06/19/22 0018)  Resp: 18 (06/19/22 0018)  BP: 117/68 (06/19/22 0018)  SpO2: 95 % (06/19/22 0018) Vital Signs (24h Range):  Temp:  [96.3 °F (35.7 °C)-100.3 °F (37.9 °C)] 96.3 °F (35.7 °C)  Pulse:  [] 110  Resp:  [16-28] 18  SpO2:  [94 %-98 %] 95 %  BP: (110-143)/(67-90) 117/68     Weight: 79.7 kg (175 lb 11.3 oz)  Body mass index is 30.16 kg/m².    Physical Exam  Vitals and nursing note reviewed.   Constitutional:       General: She is awake.       Appearance: She is ill-appearing.      Comments: Has wet towel on forehead   HENT:      Head: Normocephalic and atraumatic.      Mouth/Throat:      Mouth: Mucous membranes are moist.   Eyes:      General: No scleral icterus.     Conjunctiva/sclera: Conjunctivae normal.   Cardiovascular:      Rate and Rhythm: Normal rate and regular rhythm.      Heart sounds: No murmur heard.  Pulmonary:      Effort: Pulmonary effort is normal. No respiratory distress.      Breath sounds: No wheezing.   Abdominal:      Palpations: Abdomen is soft.      Tenderness: There is no guarding or rebound.      Comments: NGT in place. Decreased bowel sounds   Musculoskeletal:         General: No swelling.      Cervical back: Normal range of motion and neck supple.   Skin:     General: Skin is warm.      Coloration: Skin is not jaundiced.   Neurological:      General: No focal deficit present.      Mental Status: She is alert and oriented to person, place, and time. Mental status is at baseline.   Psychiatric:         Attention and Perception: She is inattentive.           Significant Labs: All pertinent labs within the past 24 hours have been reviewed.  Recent Lab Results         06/18/22  0924   06/18/22  0632   06/18/22  0621        Albumin     4.1       Alkaline Phosphatase     53       ALT     17       Anion Gap     10       Appearance, UA   Clear         AST     21       Baso #     0.06       Basophil %     0.5       Bilirubin (UA)   Negative         BILIRUBIN TOTAL     0.2  Comment: For infants and newborns, interpretation of results should be based  on gestational age, weight and in agreement with clinical  observations.    Premature Infant recommended reference ranges:  Up to 24 hours.............<8.0 mg/dL  Up to 48 hours............<12.0 mg/dL  3-5 days..................<15.0 mg/dL  6-29 days.................<15.0 mg/dL         BUN     18       Calcium     9.6       Chloride     104       CO2     22       Color, UA   Yellow          Creatinine     0.7       Differential Method     Automated       eGFR if      >60       eGFR if non      >60  Comment: Calculation used to obtain the estimated glomerular filtration  rate (eGFR) is the CKD-EPI equation.          Eos #     0.0       Eosinophil %     0.3       Glucose     129       Glucose, UA   Negative         Gran # (ANC)     10.4       Gran %     86.2       Hematocrit     42.3       Hemoglobin     14.4       Immature Grans (Abs)     0.05  Comment: Mild elevation in immature granulocytes is non specific and   can be seen in a variety of conditions including stress response,   acute inflammation, trauma and pregnancy. Correlation with other   laboratory and clinical findings is essential.         Immature Granulocytes     0.4       Ketones, UA   Negative         Leukocytes, UA   Negative         Lipase     23       Lymph #     0.6       Lymph %     5.1       MCH     31.7       MCHC     34.0       MCV     93       Mono #     0.9       Mono %     7.5       MPV     9.5       NITRITE UA   Negative         nRBC     0       Occult Blood UA   Trace         pH, UA   7.0         Platelets     396       Potassium     4.9       Preg Test, Ur   Negative         PROTEIN TOTAL     7.3       Protein, UA   Negative  Comment: Recommend a 24 hour urine protein or a urine   protein/creatinine ratio if globulin induced proteinuria is  clinically suspected.            Acceptable Yes           RBC     4.54       RDW     11.9       SARS-CoV-2 RNA, Amplification, Qual Negative           Sodium     136       Specific Gravity, UA   <=1.005         Specimen UA   Urine, Clean Catch         UROBILINOGEN UA   Negative         WBC     12.04               Significant Imaging: I have reviewed all pertinent imaging results/findings within the past 24 hours.  I have reviewed and interpreted all pertinent imaging results/findings within the past 24 hours.    Imaging Results               X-Ray Chest AP Portable (Final result)  Result time 06/18/22 09:52:20      Final result by Ricardo Shay MD (06/18/22 09:52:20)                   Impression:      1. Nasogastric tube placement.  Distal tip proximal body of stomach, good position.  2. Discoid atelectasis right lower lung.      Electronically signed by: Ricardo Shay  Date:    06/18/2022  Time:    09:52               Narrative:    EXAMINATION:  XR CHEST AP PORTABLE    CLINICAL HISTORY:  Encounter for other specified special examinations    COMPARISON:  11/23/2021    FINDINGS:  Mild discoid atelectasis right lower lung.  Left lung clear.  Heart size within normal limits.Nasogastric tube in place.  Distal tip overlies the proximal body of the stomach.  No significant bony findings.                                       CT Abdomen Pelvis  Without Contrast (Final result)  Result time 06/18/22 07:29:16      Final result by Ricardo Shay MD (06/18/22 07:29:16)                   Impression:      1. Findings worrisome for early small bowel obstruction or partial small bowel obstruction.  New since prior exam 05/17/2021.  See above for details.  2. Previous right ovarian cyst resolved.  All CT scans at this facility are performed  using dose modulation techniques as appropriate to performed exam including the following:  automated exposure control; adjustment of mA and/or kV according to the patients size (this includes techniques or standardized protocols for targeted exams where dose is matched to indication/reason for exam: i.e. extremities or head);  iterative reconstruction technique.      Electronically signed by: Ricardo Shay  Date:    06/18/2022  Time:    07:29               Narrative:    EXAMINATION:  CT ABDOMEN PELVIS WITHOUT CONTRAST    CLINICAL HISTORY:  Bowel obstruction suspected;. Abdominal pain.  Nausea and vomiting.    TECHNIQUE:  Low dose axial images, sagittal and coronal reformations were obtained from the lung  bases to the pubic symphysis.    COMPARISON:  05/17/2021    FINDINGS:  Minimal atelectasis posterior right lung base.  Dependent atelectasis posterior left lung base.  Visualized heart normal in size.    The liver is normal.  The gallbladder is normal.    Normal spleen.  Normal pancreas.  Mild thickening medial limb left adrenal gland.  Low-density nodule medial limb right adrenal gland with Hounsfield units measuring -4, consistent with an adenoma (13 mm).  No change since prior exam.  Aorta and iliac vasculature normal in caliber with atherosclerotic calcification.    The left kidney is normal.  Left ureter is normal.  Minimal prominence of the right renal pelvis and proximal ureter but no hydronephrosis.  No obstructing lesion.    Stomach is distended with fluid, worse since prior exam.  Within the lower abdomen are new since the prior examination.  Collapse normal caliber ileal loops.  Findings worrisome for early or partial small bowel obstruction.  Normal appendix.  Dilated fluid-filled loops of small bowel the colon is normal.  The rectum is normal.    The pelvis demonstrates distended, normal bladder.  Uterus normal.  No adnexal masses.  Previous right ovarian cyst resolved.    No significant abnormality of the abdominopelvic wall soft tissues.  Grade 2 isthmic spondylolisthesis L4-L5, unchanged since the prior exam.  No suspicious osseous lesions.                                      I have independently reviewed all pertinent labs within the past 24 hours.    I have independently reviewed, visualized and interpreted all pertinent imaging results within the past 24 hours

## 2022-06-19 NOTE — SUBJECTIVE & OBJECTIVE
Past Medical History:   Diagnosis Date    PONV (postoperative nausea and vomiting)     SBO (small bowel obstruction) 6/18/2022       Past Surgical History:   Procedure Laterality Date    BLADDER REPAIR      CARPAL TUNNEL RELEASE Right 4/5/2021    Procedure: RELEASE, CARPAL TUNNEL;  Surgeon: Michael Frank MD;  Location: [facility] OR;  Service: Orthopedics;  Laterality: Right;    COLONOSCOPY N/A 5/19/2021    Procedure: COLONOSCOPY;  Surgeon: Emeka Pritchett MD;  Location: [facility];  Service: General;  Laterality: N/A;    EYE SURGERY Left     HERNIA REPAIR      SMALL INTESTINE SURGERY      TONSILLECTOMY      TUBAL LIGATION      URETHRA SURGERY         Review of patient's allergies indicates:   Allergen Reactions    Sulfa (sulfonamide antibiotics) Swelling     Lip swelling       Current Facility-Administered Medications on File Prior to Encounter   Medication    lactated ringers infusion    lactated ringers infusion    nozaseptin (NOZIN) nasal      Current Outpatient Medications on File Prior to Encounter   Medication Sig    amitriptyline (ELAVIL) 25 MG tablet Take 1 tablet (25 mg total) by mouth nightly as needed for Insomnia.    ascorbate calcium (JULIA-C ORAL) Take 1,000 mg by mouth once daily.    aspirin-acetaminophen-caffeine 250-250-65 mg (EXCEDRIN MIGRAINE) 250-250-65 mg per tablet Take 3 tablets by mouth every 4 to 6 hours as needed for Pain.    cyclobenzaprine (FLEXERIL) 10 MG tablet TAKE 1/2 TO 1 (ONE-HALF TO ONE) TABLET BY MOUTH TWICE DAILY AS NEEDED FOR MUSCLE SPASM MAY CAUSE DROWSINESS    gabapentin (NEURONTIN) 300 MG capsule Take 1 capsule (300 mg total) by mouth 2 (two) times daily as needed (pain).    inulin (FIBER GUMMIES ORAL) Take 1 Piece by mouth once daily.    multivitamin capsule Take 1 capsule by mouth once daily.    omeprazole (PRILOSEC) 40 MG capsule Take 1 capsule (40 mg total) by mouth once daily.    polyethylene glycol (GLYCOLAX) 17 gram/dose powder Take 17 g by mouth  nightly.     Family History       Problem Relation (Age of Onset)    Prostate cancer Father          Tobacco Use    Smoking status: Current Every Day Smoker     Packs/day: 1.00     Years: 10.00     Pack years: 10.00     Types: Cigarettes    Smokeless tobacco: Never Used   Substance and Sexual Activity    Alcohol use: Not Currently    Drug use: Never    Sexual activity: Yes     Partners: Male     Review of Systems   Constitutional: Negative.  Negative for chills and fever.   HENT: Negative.  Negative for congestion, rhinorrhea, sore throat and trouble swallowing.    Eyes: Negative.  Negative for visual disturbance.   Respiratory: Negative.  Negative for cough, shortness of breath and wheezing.    Cardiovascular: Negative.  Negative for chest pain and palpitations.   Gastrointestinal:  Positive for abdominal pain and nausea. Negative for diarrhea and vomiting.   Endocrine: Negative.    Genitourinary:  Positive for pelvic pain. Negative for dysuria and flank pain.   Musculoskeletal:  Positive for back pain.   Skin: Negative.  Negative for rash.   Allergic/Immunologic: Negative.    Neurological:  Positive for headaches. Negative for speech difficulty, weakness and numbness.   Hematological: Negative.    Psychiatric/Behavioral:  Positive for sleep disturbance. Negative for hallucinations. The patient is not nervous/anxious.    All other systems reviewed and are negative.  Objective:     Vital Signs (Most Recent):  Temp: 96.3 °F (35.7 °C) (06/19/22 0018)  Pulse: 110 (06/19/22 0018)  Resp: 18 (06/19/22 0018)  BP: 117/68 (06/19/22 0018)  SpO2: 95 % (06/19/22 0018) Vital Signs (24h Range):  Temp:  [96.3 °F (35.7 °C)-100.3 °F (37.9 °C)] 96.3 °F (35.7 °C)  Pulse:  [] 110  Resp:  [16-28] 18  SpO2:  [94 %-98 %] 95 %  BP: (110-143)/(67-90) 117/68     Weight: 79.7 kg (175 lb 11.3 oz)  Body mass index is 30.16 kg/m².    Physical Exam  Vitals and nursing note reviewed.   Constitutional:       General: She is awake.       Appearance: She is ill-appearing.      Comments: Has wet towel on forehead   HENT:      Head: Normocephalic and atraumatic.      Mouth/Throat:      Mouth: Mucous membranes are moist.   Eyes:      General: No scleral icterus.     Conjunctiva/sclera: Conjunctivae normal.   Cardiovascular:      Rate and Rhythm: Normal rate and regular rhythm.      Heart sounds: No murmur heard.  Pulmonary:      Effort: Pulmonary effort is normal. No respiratory distress.      Breath sounds: No wheezing.   Abdominal:      Palpations: Abdomen is soft.      Tenderness: There is no guarding or rebound.      Comments: NGT in place. Decreased bowel sounds   Musculoskeletal:         General: No swelling.      Cervical back: Normal range of motion and neck supple.   Skin:     General: Skin is warm.      Coloration: Skin is not jaundiced.   Neurological:      General: No focal deficit present.      Mental Status: She is alert and oriented to person, place, and time. Mental status is at baseline.   Psychiatric:         Attention and Perception: She is inattentive.       Significant Labs: All pertinent labs within the past 24 hours have been reviewed.  Recent Lab Results         06/18/22  0924   06/18/22  0632   06/18/22  0621        Albumin     4.1       Alkaline Phosphatase     53       ALT     17       Anion Gap     10       Appearance, UA   Clear         AST     21       Baso #     0.06       Basophil %     0.5       Bilirubin (UA)   Negative         BILIRUBIN TOTAL     0.2  Comment: For infants and newborns, interpretation of results should be based  on gestational age, weight and in agreement with clinical  observations.    Premature Infant recommended reference ranges:  Up to 24 hours.............<8.0 mg/dL  Up to 48 hours............<12.0 mg/dL  3-5 days..................<15.0 mg/dL  6-29 days.................<15.0 mg/dL         BUN     18       Calcium     9.6       Chloride     104       CO2     22       Color, UA   Yellow          Creatinine     0.7       Differential Method     Automated       eGFR if      >60       eGFR if non      >60  Comment: Calculation used to obtain the estimated glomerular filtration  rate (eGFR) is the CKD-EPI equation.          Eos #     0.0       Eosinophil %     0.3       Glucose     129       Glucose, UA   Negative         Gran # (ANC)     10.4       Gran %     86.2       Hematocrit     42.3       Hemoglobin     14.4       Immature Grans (Abs)     0.05  Comment: Mild elevation in immature granulocytes is non specific and   can be seen in a variety of conditions including stress response,   acute inflammation, trauma and pregnancy. Correlation with other   laboratory and clinical findings is essential.         Immature Granulocytes     0.4       Ketones, UA   Negative         Leukocytes, UA   Negative         Lipase     23       Lymph #     0.6       Lymph %     5.1       MCH     31.7       MCHC     34.0       MCV     93       Mono #     0.9       Mono %     7.5       MPV     9.5       NITRITE UA   Negative         nRBC     0       Occult Blood UA   Trace         pH, UA   7.0         Platelets     396       Potassium     4.9       Preg Test, Ur   Negative         PROTEIN TOTAL     7.3       Protein, UA   Negative  Comment: Recommend a 24 hour urine protein or a urine   protein/creatinine ratio if globulin induced proteinuria is  clinically suspected.            Acceptable Yes           RBC     4.54       RDW     11.9       SARS-CoV-2 RNA, Amplification, Qual Negative           Sodium     136       Specific Gravity, UA   <=1.005         Specimen UA   Urine, Clean Catch         UROBILINOGEN UA   Negative         WBC     12.04               Significant Imaging: I have reviewed all pertinent imaging results/findings within the past 24 hours.  I have reviewed and interpreted all pertinent imaging results/findings within the past 24 hours.

## 2022-06-19 NOTE — PLAN OF CARE
[facility] - Med Surg  Initial Discharge Assessment       Primary Care Provider: Shannon Clements MD    Admission Diagnosis: Encounter for imaging study to confirm nasogastric (NG) tube placement [Z01.89]  Partial small bowel obstruction [K56.600]    Admission Date: 6/18/2022  Expected Discharge Date:          Payor:  / Plan:  / Product Type: Government /     Extended Emergency Contact Information  Primary Emergency Contact: Shane Lebron  Address: [address] [place] [address] [facility]  Home Phone: [phone]  Mobile Phone: [phone]  Relation: Spouse  Preferred language: English   needed? No    Discharge Plan A: Home  Discharge Plan B: Home      [facility] [address] [facility] [address] [place] [address]  [address] [address]  [place] [address]  Phone: [phone] Fax: [phone]      Initial Assessment (most recent)     Adult Discharge Assessment - 06/19/22 1252        Discharge Assessment    Assessment Type Discharge Planning Assessment     Confirmed/corrected address, phone number and insurance Yes     Confirmed Demographics Correct on Facesheet     Source of Information family;patient     If unable to respond/provide information was family/caregiver contacted? No Contact Information Available     When was your last doctors appointment? --   unable to recall    Does patient/caregiver understand observation status Yes     Reason For Admission Ab pain     Lives With spouse     Do you expect to return to your current living situation? Yes     Do you have help at home or someone to help you manage your care at home? Yes     Who are your caregiver(s) and their phone number(s)? Shane Chow, spouse     Prior to hospitilization cognitive status: Alert/Oriented     Current cognitive status: Alert/Oriented     Walking or Climbing Stairs Difficulty none     Dressing/Bathing Difficulty none     Home Layout Able to live on 1st floor     Equipment Currently Used at  Home shower chair     Readmission within 30 days? No     Patient currently being followed by outpatient case management? No     Do you currently have service(s) that help you manage your care at home? No     Do you take prescription medications? Yes     Do you have prescription coverage? Yes     Coverage      Do you have any problems affording any of your prescribed medications? No     Is the patient taking medications as prescribed? yes     Who is going to help you get home at discharge? Shane Chow     How do you get to doctors appointments? family or friend will provide     Are you on dialysis? No     Do you take coumadin? No     Discharge Plan A Home     Discharge Plan B Home        Relationship/Environment    Name(s) of Who Lives With Patient Shane Kulkarni met with pt and spouse at the [facility] to completed discharge planning. Pt reports no needs and spouse available for discharge. CM provided a transitional care folder, information on advanced directives, information on pharmacy bedside delivery, and discharge planning begins on admission with contact information for any needs/questions.

## 2022-06-19 NOTE — PROGRESS NOTES
[facility]  General Surgery  Progress Note    Subjective:     History of Present Illness:  51-year-old white female with history of small-bowel obstruction presenting with crampy abdominal pain that is generalized onset  yesterday.  She has had a bowel movement yesterday but none today.  Patient does note some nausea vomiting but no diarrhea.  No fevers or chills.  She denies any urinary complaints. history abdominal surgery for her bladder, small bowel obstruction and hernia repair.  She does smoke.     Patient complaining of a severe headache.  Has a history of migraines.  Also complaining of sore throat from the NG tube.  States her abdominal pain is improved.  Still no flatus or bowel movement today.   is by her side.  NG tube since placement put out around 200. Patient has history of multiple small-bowel obstructions.  One of them did require exploratory laparotomy but sounds like it may have been caused from a hernia.  Patient has had multiple problems since a trauma resulted in fractured pelvis and bladder rupture.  Multiple abdominal surgeries including bladder repair, exploratory lap for trauma, exploratory lap for bowel obstruction, hernia repair, bilateral tubal ligation.  She has also had eye surgery.  Awaiting to have back surgery.  Patient states she smokes daily.  At least a pack a day.  No alcohol use.  Mother and father are medically healthy.         Post-Op Info:  * No surgery found *         Interval History: 6/19/2022 admitted with severe HA and recurrent SBO. Consulted hospitalist last evening since pt stated her HA was the most severe HA she has ever experienced. CT of head was negative. Pt states her abd does not hurt. Still with thick brown NGT aspirate. Abd remains mildly distended but soft and nontender. No flatus or BM.  AF VSS. Pt more alert and talkative this afternoon. Will consider SBFT tomorrow. Pt declined today secondary to HA. Wbc wnl, elec  ok    Medications:  Continuous Infusions:   dextrose 5 % and 0.45 % NaCl with KCl 20 mEq 125 mL/hr at 06/19/22 0931     Scheduled Meds:   enoxaparin  40 mg Subcutaneous Daily    methocarbamoL  1,000 mg Intravenous Q8H    nicotine  1 patch Transdermal Daily    pantoprazole  40 mg Intravenous Daily     PRN Meds:acetaminophen, acetaminophen, HYDROmorphone, melatonin, ondansetron, promethazine (PHENERGAN) IVPB, sodium chloride 0.9%     Review of patient's allergies indicates:   Allergen Reactions    Sulfa (sulfonamide antibiotics) Swelling     Lip swelling     Objective:     Vital Signs (Most Recent):  Temp: 98.9 °F (37.2 °C) (06/19/22 1531)  Pulse: 93 (06/19/22 1531)  Resp: 18 (06/19/22 1531)  BP: 113/73 (06/19/22 1531)  SpO2: (!) 90 % (06/19/22 1531)   Vital Signs (24h Range):  Temp:  [96.3 °F (35.7 °C)-100.3 °F (37.9 °C)] 98.9 °F (37.2 °C)  Pulse:  [] 93  Resp:  [14-18] 18  SpO2:  [90 %-95 %] 90 %  BP: (113-139)/(68-84) 113/73     Weight: 79.7 kg (175 lb 11.3 oz)  Body mass index is 30.16 kg/m².    Intake/Output - Last 3 Shifts         06/17 0700  06/18 0659 06/18 0700  06/19 0659 06/19 0700  06/20 0659    P.O.  0     I.V. (mL/kg)  1672.1 (21)     IV Piggyback  98.4     Total Intake(mL/kg)  1770.5 (22.2)     Urine (mL/kg/hr)  0 (0)     Drains  100 250    Total Output  100 250    Net  +1670.5 -250           Urine Occurrence  3 x     Emesis Occurrence  2 x             Physical Exam  Vitals reviewed.   Constitutional:       General: She is not in acute distress.     Appearance: She is not ill-appearing.   HENT:      Nose:      Comments: NGT in place with thick brown aspirate     Mouth/Throat:      Mouth: Mucous membranes are dry.   Eyes:      Pupils: Pupils are equal, round, and reactive to light.   Cardiovascular:      Rate and Rhythm: Normal rate and regular rhythm.      Heart sounds: Normal heart sounds.   Pulmonary:      Effort: Pulmonary effort is normal.      Breath sounds: Normal breath sounds.    Abdominal:      General: Bowel sounds are normal. There is distension.      Palpations: Abdomen is soft.      Tenderness: There is no abdominal tenderness.   Musculoskeletal:         General: Normal range of motion.   Skin:     General: Skin is warm and dry.   Neurological:      General: No focal deficit present.      Mental Status: She is alert and oriented to person, place, and time.   Psychiatric:         Judgment: Judgment normal.       Significant Labs:  I have reviewed all pertinent lab results within the past 24 hours.  CBC:   Recent Labs   Lab 06/19/22  0610   WBC 7.89   RBC 4.13   HGB 12.9   HCT 40.0      MCV 97   MCH 31.2*   MCHC 32.3     BMP:   Recent Labs   Lab 06/19/22  0610   *      K 4.8      CO2 23   BUN 12   CREATININE 0.7   CALCIUM 8.6*     CMP:   Recent Labs   Lab 06/19/22  0610   *   CALCIUM 8.6*   ALBUMIN 3.5   PROT 6.6      K 4.8   CO2 23      BUN 12   CREATININE 0.7   ALKPHOS 46*   ALT 15   AST 16   BILITOT 0.1     LFTs:   Recent Labs   Lab 06/19/22  0610   ALT 15   AST 16   ALKPHOS 46*   BILITOT 0.1   PROT 6.6   ALBUMIN 3.5       Significant Diagnostics:  I have reviewed all pertinent imaging results/findings within the past 24 hours.    Assessment/Plan:     * Headache  hospitalist consulted. CT of head negative. Continue pain control.    SBO (small bowel obstruction)  6/19/2022 admitted with severe HA and recurrent SBO. Consulted hospitalist last evening since pt stated her HA was the most severe HA she has ever experienced. CT of head was negative. Pt states her abd does not hurt. Still with thick brown NGT aspirate. Abd remains mildly distended but soft and nontender. No flatus or BM.  AF VSS. Pt more alert and talkative this afternoon. Will consider SBFT tomorrow. Pt declined today secondary to HA.    Tobacco abuse  Nicotine patch    Acid reflux  Continue Protonix IV. Switch to po once SBO resolved        Vivienne Lopes MD  General  Surgery  [facility]

## 2022-06-19 NOTE — PLAN OF CARE
Pt remains free from injury/falls this shift. Safety precautions maintained. Pain managed with prn meds. NPO status maintained with NGT to LIWS, irrigated with 100 mL of water. VSS. No signs and symptoms of acute distress noted at this time. Chart reviewed, will continue to monitor.

## 2022-06-19 NOTE — ASSESSMENT & PLAN NOTE
6/19/2022 admitted with severe HA and recurrent SBO. Consulted hospitalist last evening since pt stated her HA was the most severe HA she has ever experienced. CT of head was negative. Pt states her abd does not hurt. Still with thick brown NGT aspirate. Abd remains mildly distended but soft and nontender. No flatus or BM.  AF VSS. Pt more alert and talkative this afternoon. Will consider SBFT tomorrow. Pt declined today secondary to HA.

## 2022-06-20 ENCOUNTER — TELEPHONE (OUTPATIENT)
Dept: NEUROSURGERY | Facility: CLINIC | Age: 52
End: 2022-06-20
Payer: OTHER GOVERNMENT

## 2022-06-20 PROBLEM — K56.600 PARTIAL SMALL BOWEL OBSTRUCTION: Status: ACTIVE | Noted: 2022-06-18

## 2022-06-20 PROCEDURE — S4991 NICOTINE PATCH NONLEGEND: HCPCS | Performed by: SURGERY

## 2022-06-20 PROCEDURE — 63600175 PHARM REV CODE 636 W HCPCS: Performed by: NURSE PRACTITIONER

## 2022-06-20 PROCEDURE — 25500020 PHARM REV CODE 255: Performed by: FAMILY MEDICINE

## 2022-06-20 PROCEDURE — C9113 INJ PANTOPRAZOLE SODIUM, VIA: HCPCS | Performed by: SURGERY

## 2022-06-20 PROCEDURE — 11000001 HC ACUTE MED/SURG PRIVATE ROOM

## 2022-06-20 PROCEDURE — 25000003 PHARM REV CODE 250: Performed by: SURGERY

## 2022-06-20 PROCEDURE — 99233 PR SUBSEQUENT HOSPITAL CARE,LEVL III: ICD-10-PCS | Mod: ,,, | Performed by: SURGERY

## 2022-06-20 PROCEDURE — 63600175 PHARM REV CODE 636 W HCPCS: Performed by: INTERNAL MEDICINE

## 2022-06-20 PROCEDURE — 99233 SBSQ HOSP IP/OBS HIGH 50: CPT | Mod: ,,, | Performed by: SURGERY

## 2022-06-20 PROCEDURE — 63600175 PHARM REV CODE 636 W HCPCS: Performed by: SURGERY

## 2022-06-20 RX ORDER — GABAPENTIN 300 MG/1
300 CAPSULE ORAL 2 TIMES DAILY PRN
Status: DISCONTINUED | OUTPATIENT
Start: 2022-06-20 | End: 2022-06-21 | Stop reason: HOSPADM

## 2022-06-20 RX ORDER — HYDROMORPHONE HYDROCHLORIDE 2 MG/ML
1 INJECTION, SOLUTION INTRAMUSCULAR; INTRAVENOUS; SUBCUTANEOUS
Status: DISCONTINUED | OUTPATIENT
Start: 2022-06-20 | End: 2022-06-21 | Stop reason: HOSPADM

## 2022-06-20 RX ORDER — AMITRIPTYLINE HYDROCHLORIDE 25 MG/1
25 TABLET, FILM COATED ORAL NIGHTLY PRN
Status: DISCONTINUED | OUTPATIENT
Start: 2022-06-20 | End: 2022-06-21 | Stop reason: HOSPADM

## 2022-06-20 RX ORDER — HYDROCODONE BITARTRATE AND ACETAMINOPHEN 10; 325 MG/1; MG/1
1 TABLET ORAL EVERY 6 HOURS PRN
Status: DISCONTINUED | OUTPATIENT
Start: 2022-06-20 | End: 2022-06-21 | Stop reason: HOSPADM

## 2022-06-20 RX ORDER — METOCLOPRAMIDE HYDROCHLORIDE 5 MG/ML
10 INJECTION INTRAMUSCULAR; INTRAVENOUS EVERY 6 HOURS
Status: DISCONTINUED | OUTPATIENT
Start: 2022-06-20 | End: 2022-06-21 | Stop reason: HOSPADM

## 2022-06-20 RX ORDER — IBUPROFEN 200 MG
1 TABLET ORAL DAILY
Status: DISCONTINUED | OUTPATIENT
Start: 2022-06-20 | End: 2022-06-21 | Stop reason: HOSPADM

## 2022-06-20 RX ORDER — HYDROCODONE BITARTRATE AND ACETAMINOPHEN 7.5; 325 MG/1; MG/1
1 TABLET ORAL EVERY 6 HOURS PRN
Status: DISCONTINUED | OUTPATIENT
Start: 2022-06-20 | End: 2022-06-21 | Stop reason: HOSPADM

## 2022-06-20 RX ORDER — HYDROMORPHONE HYDROCHLORIDE 2 MG/ML
2 INJECTION, SOLUTION INTRAMUSCULAR; INTRAVENOUS; SUBCUTANEOUS
Status: DISCONTINUED | OUTPATIENT
Start: 2022-06-20 | End: 2022-06-21 | Stop reason: HOSPADM

## 2022-06-20 RX ADMIN — POTASSIUM CHLORIDE, DEXTROSE MONOHYDRATE AND SODIUM CHLORIDE: 150; 5; 450 INJECTION, SOLUTION INTRAVENOUS at 05:06

## 2022-06-20 RX ADMIN — ENOXAPARIN SODIUM 40 MG: 40 INJECTION SUBCUTANEOUS at 05:06

## 2022-06-20 RX ADMIN — METHOCARBAMOL 1000 MG: 100 INJECTION, SOLUTION INTRAMUSCULAR; INTRAVENOUS at 01:06

## 2022-06-20 RX ADMIN — HYDROMORPHONE HYDROCHLORIDE 2 MG: 2 INJECTION INTRAMUSCULAR; INTRAVENOUS; SUBCUTANEOUS at 12:06

## 2022-06-20 RX ADMIN — POTASSIUM CHLORIDE, DEXTROSE MONOHYDRATE AND SODIUM CHLORIDE: 150; 5; 450 INJECTION, SOLUTION INTRAVENOUS at 04:06

## 2022-06-20 RX ADMIN — NICOTINE 1 PATCH: 14 PATCH, EXTENDED RELEASE TRANSDERMAL at 03:06

## 2022-06-20 RX ADMIN — PANTOPRAZOLE SODIUM 40 MG: 40 INJECTION, POWDER, FOR SOLUTION INTRAVENOUS at 11:06

## 2022-06-20 RX ADMIN — METHOCARBAMOL 1000 MG: 100 INJECTION, SOLUTION INTRAMUSCULAR; INTRAVENOUS at 10:06

## 2022-06-20 RX ADMIN — HYDROMORPHONE HYDROCHLORIDE 2 MG: 2 INJECTION INTRAMUSCULAR; INTRAVENOUS; SUBCUTANEOUS at 03:06

## 2022-06-20 RX ADMIN — DIATRIZOATE MEGLUMINE AND DIATRIZOATE SODIUM 240 ML: 660; 100 LIQUID ORAL; RECTAL at 11:06

## 2022-06-20 RX ADMIN — METHOCARBAMOL 1000 MG: 100 INJECTION, SOLUTION INTRAMUSCULAR; INTRAVENOUS at 05:06

## 2022-06-20 RX ADMIN — HYDROMORPHONE HYDROCHLORIDE 1 MG: 2 INJECTION INTRAMUSCULAR; INTRAVENOUS; SUBCUTANEOUS at 04:06

## 2022-06-20 RX ADMIN — HYDROMORPHONE HYDROCHLORIDE 2 MG: 2 INJECTION INTRAMUSCULAR; INTRAVENOUS; SUBCUTANEOUS at 08:06

## 2022-06-20 RX ADMIN — NICOTINE 1 PATCH: 21 PATCH, EXTENDED RELEASE TRANSDERMAL at 11:06

## 2022-06-20 RX ADMIN — ONDANSETRON 4 MG: 2 INJECTION INTRAMUSCULAR; INTRAVENOUS at 08:06

## 2022-06-20 RX ADMIN — PROMETHAZINE HYDROCHLORIDE 12.5 MG: 25 INJECTION INTRAMUSCULAR; INTRAVENOUS at 04:06

## 2022-06-20 RX ADMIN — METOCLOPRAMIDE 10 MG: 5 INJECTION, SOLUTION INTRAMUSCULAR; INTRAVENOUS at 05:06

## 2022-06-20 RX ADMIN — HYDROMORPHONE HYDROCHLORIDE 1 MG: 2 INJECTION INTRAMUSCULAR; INTRAVENOUS; SUBCUTANEOUS at 08:06

## 2022-06-20 NOTE — SUBJECTIVE & OBJECTIVE
Interval History: SB follow through today. Pt took shower this AM - reports she is feeling better.     Review of Systems   Constitutional:  Negative for chills and fever.   HENT:  Negative for congestion, rhinorrhea, sore throat and trouble swallowing.    Eyes:  Negative for visual disturbance.   Respiratory:  Negative for cough, shortness of breath and wheezing.    Cardiovascular:  Negative for chest pain and palpitations.   Gastrointestinal:  Positive for abdominal pain and nausea. Negative for diarrhea and vomiting.   Endocrine: Negative.    Genitourinary:  Positive for pelvic pain. Negative for dysuria and flank pain.   Musculoskeletal:  Positive for back pain.   Skin:  Negative for rash.   Allergic/Immunologic: Negative.    Neurological:  Positive for headaches. Negative for speech difficulty, weakness and numbness.   Hematological: Negative.    Psychiatric/Behavioral:  Positive for sleep disturbance. Negative for agitation, behavioral problems and hallucinations. The patient is not nervous/anxious.    All other systems reviewed and are negative.  Objective:     Vital Signs (Most Recent):  Temp: 98.5 °F (36.9 °C) (06/20/22 1115)  Pulse: 101 (06/20/22 1115)  Resp: 19 (06/20/22 1115)  BP: 128/84 (06/20/22 1115)  SpO2: 97 % (06/20/22 1115) Vital Signs (24h Range):  Temp:  [96.9 °F (36.1 °C)-100.3 °F (37.9 °C)] 98.5 °F (36.9 °C)  Pulse:  [] 101  Resp:  [16-20] 19  SpO2:  [90 %-97 %] 97 %  BP: (104-128)/(64-84) 128/84     Weight: 79.7 kg (175 lb 11.3 oz)  Body mass index is 30.16 kg/m².    Intake/Output Summary (Last 24 hours) at 6/20/2022 1146  Last data filed at 6/20/2022 0525  Gross per 24 hour   Intake 2593.67 ml   Output 1050 ml   Net 1543.67 ml      Physical Exam  Vitals and nursing note reviewed.   Constitutional:       General: She is awake.      Appearance: She is ill-appearing.      Comments: Has wet towel on forehead   HENT:      Head: Normocephalic and atraumatic.      Mouth/Throat:      Mouth:  Mucous membranes are moist.   Eyes:      General: No scleral icterus.     Conjunctiva/sclera: Conjunctivae normal.   Cardiovascular:      Rate and Rhythm: Normal rate and regular rhythm.      Heart sounds: No murmur heard.  Pulmonary:      Effort: Pulmonary effort is normal. No respiratory distress.      Breath sounds: No wheezing.   Abdominal:      Palpations: Abdomen is soft.      Tenderness: There is no guarding or rebound.      Comments: NGT in place. Decreased bowel sounds   Genitourinary:     Comments: deferred  Musculoskeletal:         General: No swelling.      Cervical back: Normal range of motion and neck supple.   Skin:     General: Skin is warm.      Capillary Refill: Capillary refill takes 2 to 3 seconds.      Coloration: Skin is not jaundiced.   Neurological:      General: No focal deficit present.      Mental Status: She is alert and oriented to person, place, and time. Mental status is at baseline.   Psychiatric:         Attention and Perception: She is inattentive.       Significant Labs: All pertinent labs within the past 24 hours have been reviewed.  Recent Lab Results       None            Significant Imaging: I have reviewed all pertinent imaging results/findings within the past 24 hours.

## 2022-06-20 NOTE — SUBJECTIVE & OBJECTIVE
Interval History:  Still with a headache but improved, NG tube discomfort, flatus and bowel movements, less abdominal pain    Small-bowel follow-through today    Medications:  Continuous Infusions:   dextrose 5 % and 0.45 % NaCl with KCl 20 mEq 125 mL/hr at 06/20/22 0525     Scheduled Meds:   enoxaparin  40 mg Subcutaneous Daily    methocarbamoL  1,000 mg Intravenous Q8H    nicotine  1 patch Transdermal Daily    pantoprazole  40 mg Intravenous Daily     PRN Meds:acetaminophen, acetaminophen, HYDROmorphone, melatonin, ondansetron, promethazine (PHENERGAN) IVPB, sodium chloride 0.9%     Review of patient's allergies indicates:   Allergen Reactions    Sulfa (sulfonamide antibiotics) Swelling     Lip swelling     Objective:     Vital Signs (Most Recent):  Temp: 99.3 °F (37.4 °C) (06/20/22 0729)  Pulse: 96 (06/20/22 0729)  Resp: 17 (06/20/22 0822)  BP: 124/76 (06/20/22 0729)  SpO2: (!) 94 % (06/20/22 0729)   Vital Signs (24h Range):  Temp:  [96.9 °F (36.1 °C)-100.3 °F (37.9 °C)] 99.3 °F (37.4 °C)  Pulse:  [91-99] 96  Resp:  [14-20] 17  SpO2:  [90 %-97 %] 94 %  BP: (104-127)/(64-81) 124/76     Weight: 79.7 kg (175 lb 11.3 oz)  Body mass index is 30.16 kg/m².    Intake/Output - Last 3 Shifts         06/18 0700 06/19 0659 06/19 0700 06/20 0659 06/20 0700 06/21 0659    P.O. 0      I.V. (mL/kg) 1672.1 (21) 2442.1 (30.6)     IV Piggyback 98.4 151.6     Total Intake(mL/kg) 1770.5 (22.2) 2593.7 (32.5)     Urine (mL/kg/hr) 0 (0) 0 (0)     Drains 100 1300     Total Output 100 1300     Net +1670.5 +1293.7            Urine Occurrence 3 x 4 x     Emesis Occurrence 2 x              Physical Exam  Constitutional:       Appearance: She is well-developed.   HENT:      Head: Normocephalic.   Eyes:      Pupils: Pupils are equal, round, and reactive to light.   Neck:      Thyroid: No thyromegaly.      Vascular: No JVD.      Trachea: No tracheal deviation.   Cardiovascular:      Rate and Rhythm: Normal rate and regular rhythm.      Heart  sounds: Normal heart sounds.   Pulmonary:      Breath sounds: Normal breath sounds. No wheezing.   Abdominal:      General: Bowel sounds are normal. There is no distension (Mild).      Palpations: Abdomen is soft. Abdomen is not rigid. There is no mass.      Tenderness: There is no abdominal tenderness (minimal). There is no guarding or rebound.      Comments: Slightly obese.  Long midline incision.   Musculoskeletal:         General: Normal range of motion.   Lymphadenopathy:      Cervical: No cervical adenopathy.   Skin:     General: Skin is warm and dry.      Findings: No erythema or rash.   Neurological:      Mental Status: She is oriented to person, place, and time.   Psychiatric:         Mood and Affect: Mood normal.         Thought Content: Thought content normal.         Judgment: Judgment normal.       Significant Labs:  I have reviewed all pertinent lab results within the past 24 hours.  CBC:   Recent Labs   Lab 06/19/22  0610   WBC 7.89   RBC 4.13   HGB 12.9   HCT 40.0      MCV 97   MCH 31.2*   MCHC 32.3     BMP:   Recent Labs   Lab 06/19/22  0610   *      K 4.8      CO2 23   BUN 12   CREATININE 0.7   CALCIUM 8.6*       Significant Diagnostics:  I have reviewed all pertinent imaging results/findings within the past 24 hours.  Small-bowel follow-through in progress

## 2022-06-20 NOTE — CARE UPDATE
Small bowel follow through shows normal transit. Pt reports she has had multiple BM's since study. Orders placed to DC NGT and start clear liquid diet. Message sent to nurse.

## 2022-06-20 NOTE — PROGRESS NOTES
Small-bowel follow-through showed no evidence of obstruction.    DC NG tube  Clear liquids  Oral and IV narcotics

## 2022-06-20 NOTE — PLAN OF CARE
Pt remains free of falls/injury this shift. Safety precautions maintained. Pain managed with PRN medications. IVFs infusing. VSS. No signs and symptoms of acute distress noted at this time. 12 hour chart check completed. Will continue to monitor.

## 2022-06-20 NOTE — PLAN OF CARE
Problem: Adult Inpatient Plan of Care  Goal: Plan of Care Review  Outcome: Ongoing, Progressing  Goal: Patient-Specific Goal (Individualized)  Outcome: Ongoing, Progressing  Goal: Absence of Hospital-Acquired Illness or Injury  Outcome: Ongoing, Progressing  Goal: Optimal Comfort and Wellbeing  Outcome: Ongoing, Progressing  Goal: Readiness for Transition of Care  Outcome: Ongoing, Progressing     Problem: Fall Injury Risk  Goal: Absence of Fall and Fall-Related Injury  Outcome: Ongoing, Progressing     Problem: Pain Acute  Goal: Acceptable Pain Control and Functional Ability  Outcome: Ongoing, Progressing     Problem: Infection  Goal: Absence of Infection Signs and Symptoms  Outcome: Ongoing, Progressing     Problem: Nausea and Vomiting  Goal: Fluid and Electrolyte Balance  Outcome: Ongoing, Progressing

## 2022-06-20 NOTE — TELEPHONE ENCOUNTER
Attempted to place sx labs order for pt pt is currently admitted in the hospital will inform provider.

## 2022-06-20 NOTE — ASSESSMENT & PLAN NOTE
Small-bowel follow-through today.    If no obstruction seen then DC NG tube and start clear liquids.    Repeat abdominal films tomorrow.    Further recommendations pending small-bowel follow

## 2022-06-20 NOTE — PROGRESS NOTES
[facility]  General Surgery  Progress Note    Subjective:     History of Present Illness:  51-year-old white female with history of small-bowel obstruction presenting with crampy abdominal pain that is generalized onset  yesterday.  She has had a bowel movement yesterday but none today.  Patient does note some nausea vomiting but no diarrhea.  No fevers or chills.  She denies any urinary complaints. history abdominal surgery for her bladder, small bowel obstruction and hernia repair.  She does smoke.     Patient complaining of a severe headache.  Has a history of migraines.  Also complaining of sore throat from the NG tube.  States her abdominal pain is improved.  Still no flatus or bowel movement today.   is by her side.  NG tube since placement put out around 200. Patient has history of multiple small-bowel obstructions.  One of them did require exploratory laparotomy but sounds like it may have been caused from a hernia.  Patient has had multiple problems since a trauma resulted in fractured pelvis and bladder rupture.  Multiple abdominal surgeries including bladder repair, exploratory lap for trauma, exploratory lap for bowel obstruction, hernia repair, bilateral tubal ligation.  She has also had eye surgery.  Awaiting to have back surgery.  Patient states she smokes daily.  At least a pack a day.  No alcohol use.  Mother and father are medically healthy.         Post-Op Info:  * No surgery found *         Interval History:  Still with a headache but improved, NG tube discomfort, flatus and bowel movements, less abdominal pain    Small-bowel follow-through today    Medications:  Continuous Infusions:   dextrose 5 % and 0.45 % NaCl with KCl 20 mEq 125 mL/hr at 06/20/22 0525     Scheduled Meds:   enoxaparin  40 mg Subcutaneous Daily    methocarbamoL  1,000 mg Intravenous Q8H    nicotine  1 patch Transdermal Daily    pantoprazole  40 mg Intravenous Daily     PRN Meds:acetaminophen, acetaminophen,  HYDROmorphone, melatonin, ondansetron, promethazine (PHENERGAN) IVPB, sodium chloride 0.9%     Review of patient's allergies indicates:   Allergen Reactions    Sulfa (sulfonamide antibiotics) Swelling     Lip swelling     Objective:     Vital Signs (Most Recent):  Temp: 99.3 °F (37.4 °C) (06/20/22 0729)  Pulse: 96 (06/20/22 0729)  Resp: 17 (06/20/22 0822)  BP: 124/76 (06/20/22 0729)  SpO2: (!) 94 % (06/20/22 0729)   Vital Signs (24h Range):  Temp:  [96.9 °F (36.1 °C)-100.3 °F (37.9 °C)] 99.3 °F (37.4 °C)  Pulse:  [91-99] 96  Resp:  [14-20] 17  SpO2:  [90 %-97 %] 94 %  BP: (104-127)/(64-81) 124/76     Weight: 79.7 kg (175 lb 11.3 oz)  Body mass index is 30.16 kg/m².    Intake/Output - Last 3 Shifts         06/18 0700  06/19 0659 06/19 0700  06/20 0659 06/20 0700  06/21 0659    P.O. 0      I.V. (mL/kg) 1672.1 (21) 2442.1 (30.6)     IV Piggyback 98.4 151.6     Total Intake(mL/kg) 1770.5 (22.2) 2593.7 (32.5)     Urine (mL/kg/hr) 0 (0) 0 (0)     Drains 100 1300     Total Output 100 1300     Net +1670.5 +1293.7            Urine Occurrence 3 x 4 x     Emesis Occurrence 2 x              Physical Exam  Constitutional:       Appearance: She is well-developed.   HENT:      Head: Normocephalic.   Eyes:      Pupils: Pupils are equal, round, and reactive to light.   Neck:      Thyroid: No thyromegaly.      Vascular: No JVD.      Trachea: No tracheal deviation.   Cardiovascular:      Rate and Rhythm: Normal rate and regular rhythm.      Heart sounds: Normal heart sounds.   Pulmonary:      Breath sounds: Normal breath sounds. No wheezing.   Abdominal:      General: Bowel sounds are normal. There is no distension (Mild).      Palpations: Abdomen is soft. Abdomen is not rigid. There is no mass.      Tenderness: There is no abdominal tenderness (minimal). There is no guarding or rebound.      Comments: Slightly obese.  Long midline incision.   Musculoskeletal:         General: Normal range of motion.   Lymphadenopathy:      Cervical:  No cervical adenopathy.   Skin:     General: Skin is warm and dry.      Findings: No erythema or rash.   Neurological:      Mental Status: She is oriented to person, place, and time.   Psychiatric:         Mood and Affect: Mood normal.         Thought Content: Thought content normal.         Judgment: Judgment normal.       Significant Labs:  I have reviewed all pertinent lab results within the past 24 hours.  CBC:   Recent Labs   Lab 06/19/22  0610   WBC 7.89   RBC 4.13   HGB 12.9   HCT 40.0      MCV 97   MCH 31.2*   MCHC 32.3     BMP:   Recent Labs   Lab 06/19/22  0610   *      K 4.8      CO2 23   BUN 12   CREATININE 0.7   CALCIUM 8.6*       Significant Diagnostics:  I have reviewed all pertinent imaging results/findings within the past 24 hours.  Small-bowel follow-through in progress    Assessment/Plan:     * Headache  hospitalist consulted. CT of head negative. Continue pain control.    SBO (small bowel obstruction)  Small-bowel follow-through today.    If no obstruction seen then DC NG tube and start clear liquids.    Repeat abdominal films tomorrow.    Further recommendations pending small-bowel follow    Tobacco abuse  Nicotine patch    Acid reflux  Continue Protonix IV. Switch to po once SBO resolved        Nixon Jonas MD  General Surgery  [facility]

## 2022-06-20 NOTE — PROGRESS NOTES
[facility] Medicine  Progress Note    Patient Name: Rosario Lebron  MRN: [id number]  Patient Class: IP- Inpatient   Admission Date: 6/18/2022  Length of Stay: 2 days  Attending Physician: Luther De Souza MD  Primary Care Provider: Shannon Clements MD        Subjective:     Principal Problem:Headache        HPI:  Ms. Lebron is a 52 y/o female admitted to surgery service for recurrent SBO, and currently has NGT in place. She has h/o migraines and has known pelvic fracture and lumbar DDD. She has been receiving IV morphine in the hospital with no significant improvement. Hospital medicine consulted for further evaluation. States her headache is worse than her typical migraine.  at the bedside.      Overview/Hospital Course:  Hospital medicine was consulted for medical management. Patient currently sleeping. CT head shows No acute findings.  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  Unchanged mildly prominent perivascular spaces within the basal ganglia.   NGT to suction with large amount of drainage - brown with some areas of purulence. Gen Surgery following.      Interval History: SB follow through today. Patient continues with high pain levels - continue PRN analgesia and awaiting further recs from surgery.      Review of Systems   Constitutional:  Negative for chills and fever.   HENT:  Negative for congestion, rhinorrhea, sore throat and trouble swallowing.    Eyes:  Negative for visual disturbance.   Respiratory:  Negative for cough, shortness of breath and wheezing.    Cardiovascular:  Negative for chest pain and palpitations.   Gastrointestinal:  Positive for abdominal pain and nausea. Negative for diarrhea and vomiting.   Endocrine: Negative.    Genitourinary:  Positive for pelvic pain. Negative for dysuria and flank pain.   Musculoskeletal:  Positive for back pain.   Skin:  Negative for rash.   Allergic/Immunologic: Negative.    Neurological:  Positive for headaches.  Negative for speech difficulty, weakness and numbness.   Hematological: Negative.    Psychiatric/Behavioral:  Positive for sleep disturbance. Negative for agitation, behavioral problems and hallucinations. The patient is not nervous/anxious.    All other systems reviewed and are negative.  Objective:     Vital Signs (Most Recent):  Temp: 98.5 °F (36.9 °C) (06/20/22 1115)  Pulse: 101 (06/20/22 1115)  Resp: 19 (06/20/22 1115)  BP: 128/84 (06/20/22 1115)  SpO2: 97 % (06/20/22 1115) Vital Signs (24h Range):  Temp:  [96.9 °F (36.1 °C)-100.3 °F (37.9 °C)] 98.5 °F (36.9 °C)  Pulse:  [] 101  Resp:  [16-20] 19  SpO2:  [90 %-97 %] 97 %  BP: (104-128)/(64-84) 128/84     Weight: 79.7 kg (175 lb 11.3 oz)  Body mass index is 30.16 kg/m².    Intake/Output Summary (Last 24 hours) at 6/20/2022 1146  Last data filed at 6/20/2022 0525  Gross per 24 hour   Intake 2593.67 ml   Output 1050 ml   Net 1543.67 ml      Physical Exam  Vitals and nursing note reviewed.   Constitutional:       General: She is awake.      Appearance: She is ill-appearing.      Comments: Has wet towel on forehead   HENT:      Head: Normocephalic and atraumatic.      Mouth/Throat:      Mouth: Mucous membranes are moist.   Eyes:      General: No scleral icterus.     Conjunctiva/sclera: Conjunctivae normal.   Cardiovascular:      Rate and Rhythm: Normal rate and regular rhythm.      Heart sounds: No murmur heard.  Pulmonary:      Effort: Pulmonary effort is normal. No respiratory distress.      Breath sounds: No wheezing.   Abdominal:      Palpations: Abdomen is soft.      Tenderness: There is no guarding or rebound.      Comments: NGT in place. Decreased bowel sounds   Genitourinary:     Comments: deferred  Musculoskeletal:         General: No swelling.      Cervical back: Normal range of motion and neck supple.   Skin:     General: Skin is warm.      Capillary Refill: Capillary refill takes 2 to 3 seconds.      Coloration: Skin is not jaundiced.    Neurological:      General: No focal deficit present.      Mental Status: She is alert and oriented to person, place, and time. Mental status is at baseline.   Psychiatric:         Attention and Perception: She is inattentive.       Significant Labs: All pertinent labs within the past 24 hours have been reviewed.  Recent Lab Results       None            Significant Imaging: I have reviewed all pertinent imaging results/findings within the past 24 hours.      Assessment/Plan:      * Headache  -likely migraine (but patient states this is worse than her typical migraine)  --CT shows No acute findings.  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  Unchanged mildly prominent perivascular spaces within the basal ganglia  --PRN analgesia      Partial small bowel obstruction  -defer further management to primary team  06/20:  --SB follow through today  --NGT to suction      Tobacco abuse  --cessation counseling      Acid reflux  --IV pantoprazole        VTE Risk Mitigation (From admission, onward)         Ordered     IP VTE HIGH RISK PATIENT  Once         06/18/22 1605     Place sequential compression device  Until discontinued         06/18/22 1605     Place VIKTOR hose  Until discontinued         06/18/22 1605     enoxaparin injection 40 mg  Daily         06/18/22 1332                Discharge Planning   AMRIT:      Code Status: Full Code   Is the patient medically ready for discharge?:     Reason for patient still in hospital (select all that apply): Patient trending condition, Treatment and Consult recommendations  Discharge Plan A: OSIEL Montemayor  Department of Hospital Medicine   [facility]

## 2022-06-21 VITALS
TEMPERATURE: 98 F | WEIGHT: 158.94 LBS | BODY MASS INDEX: 27.13 KG/M2 | OXYGEN SATURATION: 96 % | DIASTOLIC BLOOD PRESSURE: 75 MMHG | SYSTOLIC BLOOD PRESSURE: 116 MMHG | HEART RATE: 82 BPM | RESPIRATION RATE: 18 BRPM | HEIGHT: 64 IN

## 2022-06-21 LAB
ANION GAP SERPL CALC-SCNC: 7 MMOL/L (ref 8–16)
BUN SERPL-MCNC: 8 MG/DL (ref 6–20)
CALCIUM SERPL-MCNC: 8.6 MG/DL (ref 8.7–10.5)
CHLORIDE SERPL-SCNC: 109 MMOL/L (ref 95–110)
CO2 SERPL-SCNC: 23 MMOL/L (ref 23–29)
CREAT SERPL-MCNC: 0.6 MG/DL (ref 0.5–1.4)
EST. GFR  (AFRICAN AMERICAN): >60 ML/MIN/1.73 M^2
EST. GFR  (NON AFRICAN AMERICAN): >60 ML/MIN/1.73 M^2
GLUCOSE SERPL-MCNC: 96 MG/DL (ref 70–110)
POTASSIUM SERPL-SCNC: 3.9 MMOL/L (ref 3.5–5.1)
SODIUM SERPL-SCNC: 139 MMOL/L (ref 136–145)

## 2022-06-21 PROCEDURE — 63600175 PHARM REV CODE 636 W HCPCS: Performed by: SURGERY

## 2022-06-21 PROCEDURE — 99238 HOSP IP/OBS DSCHRG MGMT 30/<: CPT | Mod: ,,, | Performed by: SURGERY

## 2022-06-21 PROCEDURE — 63600175 PHARM REV CODE 636 W HCPCS: Performed by: NURSE PRACTITIONER

## 2022-06-21 PROCEDURE — S4991 NICOTINE PATCH NONLEGEND: HCPCS | Performed by: SURGERY

## 2022-06-21 PROCEDURE — 80048 BASIC METABOLIC PNL TOTAL CA: CPT | Performed by: FAMILY MEDICINE

## 2022-06-21 PROCEDURE — C9113 INJ PANTOPRAZOLE SODIUM, VIA: HCPCS | Performed by: SURGERY

## 2022-06-21 PROCEDURE — 25000003 PHARM REV CODE 250: Performed by: SURGERY

## 2022-06-21 PROCEDURE — 99238 PR HOSPITAL DISCHARGE DAY,<30 MIN: ICD-10-PCS | Mod: ,,, | Performed by: SURGERY

## 2022-06-21 PROCEDURE — 36415 COLL VENOUS BLD VENIPUNCTURE: CPT | Performed by: FAMILY MEDICINE

## 2022-06-21 RX ORDER — METOCLOPRAMIDE 10 MG/1
10 TABLET ORAL 4 TIMES DAILY
Qty: 120 TABLET | Refills: 0 | Status: SHIPPED | OUTPATIENT
Start: 2022-06-21 | End: 2022-07-15 | Stop reason: SDUPTHER

## 2022-06-21 RX ADMIN — PANTOPRAZOLE SODIUM 40 MG: 40 INJECTION, POWDER, FOR SOLUTION INTRAVENOUS at 09:06

## 2022-06-21 RX ADMIN — POTASSIUM CHLORIDE, DEXTROSE MONOHYDRATE AND SODIUM CHLORIDE: 150; 5; 450 INJECTION, SOLUTION INTRAVENOUS at 06:06

## 2022-06-21 RX ADMIN — METOCLOPRAMIDE 10 MG: 5 INJECTION, SOLUTION INTRAMUSCULAR; INTRAVENOUS at 05:06

## 2022-06-21 RX ADMIN — METOCLOPRAMIDE 10 MG: 5 INJECTION, SOLUTION INTRAMUSCULAR; INTRAVENOUS at 11:06

## 2022-06-21 RX ADMIN — METHOCARBAMOL 1000 MG: 100 INJECTION, SOLUTION INTRAMUSCULAR; INTRAVENOUS at 06:06

## 2022-06-21 RX ADMIN — METHOCARBAMOL 1000 MG: 100 INJECTION, SOLUTION INTRAMUSCULAR; INTRAVENOUS at 01:06

## 2022-06-21 RX ADMIN — NICOTINE 1 PATCH: 14 PATCH, EXTENDED RELEASE TRANSDERMAL at 09:06

## 2022-06-21 RX ADMIN — HYDROCODONE BITARTRATE AND ACETAMINOPHEN 1 TABLET: 7.5; 325 TABLET ORAL at 12:06

## 2022-06-21 RX ADMIN — METOCLOPRAMIDE 10 MG: 5 INJECTION, SOLUTION INTRAMUSCULAR; INTRAVENOUS at 12:06

## 2022-06-21 RX ADMIN — HYDROMORPHONE HYDROCHLORIDE 2 MG: 2 INJECTION INTRAMUSCULAR; INTRAVENOUS; SUBCUTANEOUS at 05:06

## 2022-06-21 NOTE — NURSING
Received lying in bed awake and alert, resp even and unlabored, assessment per flowsheet,  at bedside, c/o headache starting to come on, bed low, call light within reach. Will continue to monitor.

## 2022-06-21 NOTE — ASSESSMENT & PLAN NOTE
Small-bowel follow-through showed no evidence of obstruction.  X-rays on 06/20 once showed contrast in colon and normal small bowel gas pattern.    Will be advanced from full liquids to regular diet if this is tolerated she can be discharged home

## 2022-06-21 NOTE — SUBJECTIVE & OBJECTIVE
Interval History:  Headaches are improved.  Tolerated clear liquids to full liquids after Gastrografin small-bowel follow-through showed no obstructions.  Today's x-ray showed normal small bowel gas pattern with contrast in colon.    Regular diet likely discharge after lunch if diet is    Medications:  Continuous Infusions:  Scheduled Meds:   enoxaparin  40 mg Subcutaneous Daily    methocarbamoL  1,000 mg Intravenous Q8H    metoclopramide HCl  10 mg Intravenous Q6H    nicotine  1 patch Transdermal Daily    pantoprazole  40 mg Intravenous Daily     PRN Meds:acetaminophen, acetaminophen, amitriptyline, gabapentin, HYDROcodone-acetaminophen, HYDROcodone-acetaminophen, HYDROmorphone, HYDROmorphone, ondansetron, promethazine (PHENERGAN) IVPB, sodium chloride 0.9%     Review of patient's allergies indicates:   Allergen Reactions    Sulfa (sulfonamide antibiotics) Swelling     Lip swelling     Objective:     Vital Signs (Most Recent):  Temp: 98.3 °F (36.8 °C) (06/21/22 0716)  Pulse: 99 (06/21/22 0716)  Resp: 17 (06/21/22 0716)  BP: (!) 130/91 (06/21/22 0716)  SpO2: 97 % (06/21/22 0716)   Vital Signs (24h Range):  Temp:  [97.7 °F (36.5 °C)-99.4 °F (37.4 °C)] 98.3 °F (36.8 °C)  Pulse:  [] 99  Resp:  [17-20] 17  SpO2:  [96 %-97 %] 97 %  BP: (105-130)/(57-91) 130/91     Weight: 72.1 kg (158 lb 15.2 oz)  Body mass index is 27.28 kg/m².    Intake/Output - Last 3 Shifts         06/19 0700 06/20 0659 06/20 0700 06/21 0659 06/21 0700 06/22 0659    P.O.       I.V. (mL/kg) 2442.1 (30.6) 892.2 (12.4)     IV Piggyback 151.6      Total Intake(mL/kg) 2593.7 (32.5) 892.2 (12.4)     Urine (mL/kg/hr) 0 (0)      Drains 1300      Total Output 1300      Net +1293.7 +892.2            Urine Occurrence 4 x              Physical Exam  Constitutional:       Appearance: She is well-developed.   HENT:      Head: Normocephalic.   Eyes:      Pupils: Pupils are equal, round, and reactive to light.   Neck:      Thyroid: No thyromegaly.       Vascular: No JVD.      Trachea: No tracheal deviation.   Cardiovascular:      Rate and Rhythm: Normal rate and regular rhythm.      Heart sounds: Normal heart sounds.   Pulmonary:      Breath sounds: Normal breath sounds. No wheezing.   Abdominal:      General: Abdomen is flat. Bowel sounds are normal. There is no distension.      Palpations: Abdomen is soft. Abdomen is not rigid. There is no mass.      Tenderness: There is no abdominal tenderness. There is no guarding or rebound.   Musculoskeletal:         General: Normal range of motion.   Lymphadenopathy:      Cervical: No cervical adenopathy.   Skin:     General: Skin is warm and dry.      Findings: No erythema or rash.   Neurological:      Mental Status: She is oriented to person, place, and time.   Psychiatric:         Mood and Affect: Mood normal.         Behavior: Behavior normal.         Thought Content: Thought content normal.         Judgment: Judgment normal.       Significant Labs:  I have reviewed all pertinent lab results within the past 24 hours.  CBC:   Recent Labs   Lab 06/19/22  0610   WBC 7.89   RBC 4.13   HGB 12.9   HCT 40.0      MCV 97   MCH 31.2*   MCHC 32.3     BMP:   Recent Labs   Lab 06/21/22  0639   GLU 96      K 3.9      CO2 23   BUN 8   CREATININE 0.6   CALCIUM 8.6*       Significant Diagnostics:  I have reviewed all pertinent imaging results/findings within the past 24 hours.  Small-bowel follow-through    Narrative & Impression  EXAMINATION:  XR SMALL BOWEL FOLLOW THROUGH     CLINICAL HISTORY:  SBO;     TECHNIQUE:  Detailed evaluation of the small bowel was performed with spot compression under fluoroscopy, as well as with spot and overhead radiographs.     COMPARISON:  06/18/2022     FINDINGS:  Small bowel caliber and mucosal pattern are normal. No mass, stricture, diverticula, or any intrinsic or extrinsic abnormalities demonstrated. The terminal ileum is normal in appearance.     Small bowel transit time to the  colon is within normal limits at 90 minutes.     Fluoroscopic time 0.5 min and 14 fluoroscopic images obtained.     Impression:     Normal small bowel follow-through.        Electronically signed by: Matthew Easley MD  Date:                                            06/20/2022  Time:                                           14:36    Reading Physician Reading Date Result Priority   Matthew Easley MD  [phone]  [phone] 6/21/2022 Routine     Narrative & Impression  EXAMINATION:  XR ABDOMEN FLAT AND ERECT     CLINICAL HISTORY:  Unspecified intestinal obstruction, unspecified as to partial versus complete obstructionFollow-up after small-bowel follow-through;     COMPARISON:  06/20/2022     FINDINGS:  Nonobstructive bowel gas pattern is noted. No radiopaque kidney calculus is identified.  Contrast is seen throughout the large bowel without evidence of stricture or obstruction.  No evidence of organomegaly.  The bones demonstrate mild degenerative changes within the lower lumbar region.  The bones are otherwise intact.     Impression:     Nonobstructive bowel gas pattern.        Electronically signed by: Matthew Easley MD  Date:                                            06/21/2022  Time:                                           08:27

## 2022-06-21 NOTE — PLAN OF CARE
Pt being discharged Home in stable condition. IV removed, catheter intact, pt tolerated well. No tele monitor in place. Discharge instructions given to pt, pt verbalized understanding. 12 hour chart check completed.

## 2022-06-21 NOTE — DISCHARGE SUMMARY
[facility]  Hospital Medicine  Discharge Summary      Patient Name: Rosario Lebron  MRN: [id number]  Patient Class: IP- Inpatient  Admission Date: 6/18/2022  Hospital Length of Stay: 3 days  Discharge Date and Time: 6/21/2022  2:32 PM  Attending Physician: No att. providers found   Discharging Provider: OSIEL Cerda  Primary Care Provider: Shannon Clements MD      HPI:   Ms. Lebron is a 50 y/o female admitted to surgery service for recurrent SBO, and currently has NGT in place. She has h/o migraines and has known pelvic fracture and lumbar DDD. She has been receiving IV morphine in the hospital with no significant improvement. Hospital medicine consulted for further evaluation. States her headache is worse than her typical migraine.  at the bedside.      * No surgery found *      Hospital Course:   Hospital medicine was consulted for medical management. Patient currently sleeping. CT head shows No acute findings.  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  Unchanged mildly prominent perivascular spaces within the basal ganglia.   NGT to suction with large amount of drainage - brown with some areas of purulence. Gen Surgery following.  06/20: SB follow through today. Patient continues with high pain levels - continue PRN analgesia and awaiting further recs from surgery.  06/21: SBFT showed normal transit times. NGT pulled and pt started on CLD. Also given IV reglan 10mg q 6 hours. Patient tolerated CLD with no N/V or abd pain. Diet was advanced and patient tolerated without difficulties. She is having multiple BM's. Patient high risk for readmission. Referred to [facility] at home program for ongoing management, to increase compliance, and decrease readmission. Patient was seen and examined today and deemed stable for discharge home.         Goals of Care Treatment Preferences:  Code Status: Full Code      Consults:   Consults (From admission, onward)        Status  Ordering Provider     Inpatient consult to Hospitalist  Once        Provider:  Uli Acosta MD    Acknowledged KAREN GILMORE          No new Assessment & Plan notes have been filed under this hospital service since the last note was generated.  Service: Hospital Medicine    Final Active Diagnoses:    Diagnosis Date Noted POA    PRINCIPAL PROBLEM:  SBO (small bowel obstruction) [K56.609] 06/18/2022 Yes    Headache [R51.9] 06/19/2022 Yes    Acid reflux [K21.9] 01/30/2020 Yes    Tobacco abuse [Z72.0] 01/30/2020 Yes      Problems Resolved During this Admission:       Discharged Condition: stable    Disposition: Home or Self Care    Follow Up:   Follow-up Information     Shannon Clements MD Follow up in 3 day(s).    Specialty: Family Medicine  Why: hospital follow up  Contact information:  [address]  [address]  [facility] [address]  [phone]             Nixon Jonas MD Follow up.    Specialty: General Surgery  Why: As needed  Contact information:  [address]  [place] [address]  [phone]                       Patient Instructions:      Ambulatory referral/consult to [facility]   Standing Status: Future   Referral Priority: Routine Referral Type: Consultation   Referral Reason: Specialty Services Required   Number of Visits Requested: 1     Diet Cardiac     Notify your health care provider if you experience any of the following:  increased confusion or weakness     Notify your health care provider if you experience any of the following:  persistent dizziness, light-headedness, or visual disturbances     Notify your health care provider if you experience any of the following:  worsening rash     Notify your health care provider if you experience any of the following:  severe persistent headache     Notify your health care provider if you experience any of the following:  difficulty breathing or increased cough     Notify your health care  provider if you experience any of the following:  redness, tenderness, or signs of infection (pain, swelling, redness, odor or green/yellow discharge around incision site)     Notify your health care provider if you experience any of the following:  severe uncontrolled pain     Notify your health care provider if you experience any of the following:  persistent nausea and vomiting or diarrhea     Notify your health care provider if you experience any of the following:  temperature >100.4     Notify your health care provider if you experience any of the following:  temperature >100.4     Notify your health care provider if you experience any of the following:  persistent nausea and vomiting or diarrhea     Notify your health care provider if you experience any of the following:   Order Comments: Abdominal pain, abdominal distension, persistent nausea or vomiting     Activity as tolerated     Activity as tolerated       Significant Diagnostic Studies: Labs:   BMP:   Recent Labs   Lab 06/21/22  0639   GLU 96      K 3.9      CO2 23   BUN 8   CREATININE 0.6   CALCIUM 8.6*   , CMP   Recent Labs   Lab 06/21/22  0639      K 3.9      CO2 23   GLU 96   BUN 8   CREATININE 0.6   CALCIUM 8.6*   ANIONGAP 7*   ESTGFRAFRICA >60   EGFRNONAA >60   , CBC No results for input(s): WBC, HGB, HCT, PLT in the last 48 hours. and All labs within the past 24 hours have been reviewed    Pending Diagnostic Studies:     None         Medications:  Reconciled Home Medications:      Medication List      START taking these medications    metoclopramide HCl 10 MG tablet  Commonly known as: REGLAN  Take 1 tablet (10 mg total) by mouth 4 (four) times daily.        CONTINUE taking these medications    amitriptyline 25 MG tablet  Commonly known as: ELAVIL  Take 1 tablet (25 mg total) by mouth nightly as needed for Insomnia.     aspirin-acetaminophen-caffeine 250-250-65 mg 250-250-65 mg per tablet  Commonly known as: EXCEDRIN  MIGRAINE  Take 3 tablets by mouth every 4 to 6 hours as needed for Pain.     cyclobenzaprine 10 MG tablet  Commonly known as: FLEXERIL  TAKE 1/2 TO 1 (ONE-HALF TO ONE) TABLET BY MOUTH TWICE DAILY AS NEEDED FOR MUSCLE SPASM MAY CAUSE DROWSINESS     JULIA-C ORAL  Take 1,000 mg by mouth once daily.     FIBER GUMMIES ORAL  Take 1 Piece by mouth once daily.     gabapentin 300 MG capsule  Commonly known as: NEURONTIN  Take 1 capsule (300 mg total) by mouth 2 (two) times daily as needed (pain).     multivitamin capsule  Take 1 capsule by mouth once daily.     omeprazole 40 MG capsule  Commonly known as: PRILOSEC  Take 1 capsule (40 mg total) by mouth once daily.     polyethylene glycol 17 gram/dose powder  Commonly known as: GLYCOLAX  Take 17 g by mouth nightly.            Indwelling Lines/Drains at time of discharge:   Lines/Drains/Airways     None                 Time spent on the discharge of patient: 45 minutes         PARMINDER Cerda-C  Department of Hospital Medicine  [facility]

## 2022-06-21 NOTE — PLAN OF CARE
[facility]  Discharge Final Note    Primary Care Provider: Shannon Clements MD    Expected Discharge Date: 6/21/2022    Final Discharge Note (most recent)     Final Note - 06/21/22 1017        Final Note    Assessment Type Final Discharge Note     Anticipated Discharge Disposition Home or Self Care     Hospital Resources/Appts/Education Provided Provided patient/caregiver with written discharge plan information;Appointments scheduled and added to AVS        Post-Acute Status    Discharge Delays None known at this time                 Important Message from Medicare             Contact Info     Shannon Clements MD   Specialty: Family Medicine   Relationship: PCP - General    [address] [place] [address]  [address]  [facility] [address]   Phone: [phone]       Next Steps: Follow up in 3 day(s)    Instructions: hospital follow up        Patient to dc home with self care. FU appt scheduled and added to AVS. No other cm needs.

## 2022-06-21 NOTE — DISCHARGE INSTRUCTIONS
You are at risk for developing a bowel obstruction in the future.    Should you developed abdominal pain, abdominal distension, persistent nausea vomiting please seek care.    If you have intermittent abdominal pain please make an appointment to see us in surgery or a gastroenterologist if you have one.    Below was some general information about bowel obstructions.  Please keep in mind that you do not have any known hernias

## 2022-06-21 NOTE — PROGRESS NOTES
[facility] S[facility]  General Surgery  Progress Note    Subjective:     History of Present Illness:  51-year-old white female with history of small-bowel obstruction presenting with crampy abdominal pain that is generalized onset  yesterday.  She has had a bowel movement yesterday but none today.  Patient does note some nausea vomiting but no diarrhea.  No fevers or chills.  She denies any urinary complaints. history abdominal surgery for her bladder, small bowel obstruction and hernia repair.  She does smoke.     Patient complaining of a severe headache.  Has a history of migraines.  Also complaining of sore throat from the NG tube.  States her abdominal pain is improved.  Still no flatus or bowel movement today.   is by her side.  NG tube since placement put out around 200. Patient has history of multiple small-bowel obstructions.  One of them did require exploratory laparotomy but sounds like it may have been caused from a hernia.  Patient has had multiple problems since a trauma resulted in fractured pelvis and bladder rupture.  Multiple abdominal surgeries including bladder repair, exploratory lap for trauma, exploratory lap for bowel obstruction, hernia repair, bilateral tubal ligation.  She has also had eye surgery.  Awaiting to have back surgery.  Patient states she smokes daily.  At least a pack a day.  No alcohol use.  Mother and father are medically healthy.         Post-Op Info:  * No surgery found *         Interval History:  Headaches are improved.  Tolerated clear liquids to full liquids after Gastrografin small-bowel follow-through showed no obstructions.  Today's x-ray showed normal small bowel gas pattern with contrast in colon.    Regular diet likely discharge after lunch if diet is    Medications:  Continuous Infusions:  Scheduled Meds:   enoxaparin  40 mg Subcutaneous Daily    methocarbamoL  1,000 mg Intravenous Q8H    metoclopramide HCl  10 mg Intravenous Q6H    nicotine  1 patch  Transdermal Daily    pantoprazole  40 mg Intravenous Daily     PRN Meds:acetaminophen, acetaminophen, amitriptyline, gabapentin, HYDROcodone-acetaminophen, HYDROcodone-acetaminophen, HYDROmorphone, HYDROmorphone, ondansetron, promethazine (PHENERGAN) IVPB, sodium chloride 0.9%     Review of patient's allergies indicates:   Allergen Reactions    Sulfa (sulfonamide antibiotics) Swelling     Lip swelling     Objective:     Vital Signs (Most Recent):  Temp: 98.3 °F (36.8 °C) (06/21/22 0716)  Pulse: 99 (06/21/22 0716)  Resp: 17 (06/21/22 0716)  BP: (!) 130/91 (06/21/22 0716)  SpO2: 97 % (06/21/22 0716)   Vital Signs (24h Range):  Temp:  [97.7 °F (36.5 °C)-99.4 °F (37.4 °C)] 98.3 °F (36.8 °C)  Pulse:  [] 99  Resp:  [17-20] 17  SpO2:  [96 %-97 %] 97 %  BP: (105-130)/(57-91) 130/91     Weight: 72.1 kg (158 lb 15.2 oz)  Body mass index is 27.28 kg/m².    Intake/Output - Last 3 Shifts         06/19 0700  06/20 0659 06/20 0700  06/21 0659 06/21 0700  06/22 0659    P.O.       I.V. (mL/kg) 2442.1 (30.6) 892.2 (12.4)     IV Piggyback 151.6      Total Intake(mL/kg) 2593.7 (32.5) 892.2 (12.4)     Urine (mL/kg/hr) 0 (0)      Drains 1300      Total Output 1300      Net +1293.7 +892.2            Urine Occurrence 4 x              Physical Exam  Constitutional:       Appearance: She is well-developed.   HENT:      Head: Normocephalic.   Eyes:      Pupils: Pupils are equal, round, and reactive to light.   Neck:      Thyroid: No thyromegaly.      Vascular: No JVD.      Trachea: No tracheal deviation.   Cardiovascular:      Rate and Rhythm: Normal rate and regular rhythm.      Heart sounds: Normal heart sounds.   Pulmonary:      Breath sounds: Normal breath sounds. No wheezing.   Abdominal:      General: Abdomen is flat. Bowel sounds are normal. There is no distension.      Palpations: Abdomen is soft. Abdomen is not rigid. There is no mass.      Tenderness: There is no abdominal tenderness. There is no guarding or rebound.    Musculoskeletal:         General: Normal range of motion.   Lymphadenopathy:      Cervical: No cervical adenopathy.   Skin:     General: Skin is warm and dry.      Findings: No erythema or rash.   Neurological:      Mental Status: She is oriented to person, place, and time.   Psychiatric:         Mood and Affect: Mood normal.         Behavior: Behavior normal.         Thought Content: Thought content normal.         Judgment: Judgment normal.       Significant Labs:  I have reviewed all pertinent lab results within the past 24 hours.  CBC:   Recent Labs   Lab 06/19/22  0610   WBC 7.89   RBC 4.13   HGB 12.9   HCT 40.0      MCV 97   MCH 31.2*   MCHC 32.3     BMP:   Recent Labs   Lab 06/21/22  0639   GLU 96      K 3.9      CO2 23   BUN 8   CREATININE 0.6   CALCIUM 8.6*       Significant Diagnostics:  I have reviewed all pertinent imaging results/findings within the past 24 hours.  Small-bowel follow-through    Narrative & Impression  EXAMINATION:  XR SMALL BOWEL FOLLOW THROUGH     CLINICAL HISTORY:  SBO;     TECHNIQUE:  Detailed evaluation of the small bowel was performed with spot compression under fluoroscopy, as well as with spot and overhead radiographs.     COMPARISON:  06/18/2022     FINDINGS:  Small bowel caliber and mucosal pattern are normal. No mass, stricture, diverticula, or any intrinsic or extrinsic abnormalities demonstrated. The terminal ileum is normal in appearance.     Small bowel transit time to the colon is within normal limits at 90 minutes.     Fluoroscopic time 0.5 min and 14 fluoroscopic images obtained.     Impression:     Normal small bowel follow-through.        Electronically signed by: Matthew Easley MD  Date:                                            06/20/2022  Time:                                           14:36    Reading Physician Reading Date Result Priority   Matthew Easley MD  [phone]  [phone] 6/21/2022 Routine     Narrative &  Impression  EXAMINATION:  XR ABDOMEN FLAT AND ERECT     CLINICAL HISTORY:  Unspecified intestinal obstruction, unspecified as to partial versus complete obstructionFollow-up after small-bowel follow-through;     COMPARISON:  06/20/2022     FINDINGS:  Nonobstructive bowel gas pattern is noted. No radiopaque kidney calculus is identified.  Contrast is seen throughout the large bowel without evidence of stricture or obstruction.  No evidence of organomegaly.  The bones demonstrate mild degenerative changes within the lower lumbar region.  The bones are otherwise intact.     Impression:     Nonobstructive bowel gas pattern.        Electronically signed by: Matthew Easley MD  Date:                                            06/21/2022  Time:                                           08:27      Assessment/Plan:     * Headache  [facility] was consulted.  Headaches have improved    Partial small bowel obstruction  Small-bowel follow-through showed no evidence of obstruction.  X-rays on 06/20 once showed contrast in colon and normal small bowel gas pattern.    Will be advanced from full liquids to regular diet if this is tolerated she can be discharged home        Tobacco abuse  Nicotine patch    Acid reflux  Continue Protonix IV. Switch to po once SBO resolved        Nixon Jonas MD  General Surgery  [facility]

## 2022-06-21 NOTE — DISCHARGE SUMMARY
'[facility] Surg  General Surgery  Discharge Summary      Patient Name: Rosario Lebron  MRN: [id number]  Admission Date: 6/18/2022  Hospital Length of Stay: 3 days  Discharge Date and Time:  06/21/2022 10:48 AM  Attending Physician: Luther De Souza MD   Discharging Provider: Nixon Jonas MD  Primary Care Provider: Shannon Clements MD    HPI:   51-year-old white female with history of small-bowel obstruction presenting with crampy abdominal pain that is generalized onset  yesterday.  She has had a bowel movement yesterday but none today.  Patient does note some nausea vomiting but no diarrhea.  No fevers or chills.  She denies any urinary complaints. history abdominal surgery for her bladder, small bowel obstruction and hernia repair.  She does smoke.     Patient complaining of a severe headache.  Has a history of migraines.  Also complaining of sore throat from the NG tube.  States her abdominal pain is improved.  Still no flatus or bowel movement today.   is by her side.  NG tube since placement put out around 200. Patient has history of multiple small-bowel obstructions.  One of them did require exploratory laparotomy but sounds like it may have been caused from a hernia.  Patient has had multiple problems since a trauma resulted in fractured pelvis and bladder rupture.  Multiple abdominal surgeries including bladder repair, exploratory lap for trauma, exploratory lap for bowel obstruction, hernia repair, bilateral tubal ligation.  She has also had eye surgery.  Awaiting to have back surgery.  Patient states she smokes daily.  At least a pack a day.  No alcohol use.  Mother and father are medically healthy.         * No surgery found *      Indwelling Lines/Drains at time of discharge:   Lines/Drains/Airways     None               Hospital Course: 6/19/2022 admitted with severe HA and recurrent SBO. Consulted hospitalist last evening since pt stated her HA was the most severe HA she has ever experienced. CT  of head was negative. Pt states her abd does not hurt. Still with thick brown NGT aspirate. Abd remains mildly distended but soft and nontender. No flatus or BM.  AF VSS. Pt more alert and talkative this afternoon. Will consider SBFT tomorrow. Pt declined today secondary to HA.    06/20/2022.  Still complains of a headache and NG tube discomfort.  Less abdominal pain.  Reports a bowel movement.  Small-bowel follow-through with Gastrografin today    06/21/2022.  Mild headache.  Tolerated clear liquids to full liquids after small-bowel follow-through showed no obstructions.  Having bowel movements.  No abdominal pain today.  Regular diet at lunch anticipate discharge home      Goals of Care Treatment Preferences:  Code Status: Full Code      Consults:   Consults (From admission, onward)        Status Ordering Provider     Inpatient consult to Hospitalist  Once        Provider:  MD Smiley Reynaga JENNIFER L.          Significant Diagnostic Studies: Labs:   BMP:   Recent Labs   Lab 06/21/22  0639   GLU 96      K 3.9      CO2 23   BUN 8   CREATININE 0.6   CALCIUM 8.6*   , CMP   Recent Labs   Lab 06/21/22  0639      K 3.9      CO2 23   GLU 96   BUN 8   CREATININE 0.6   CALCIUM 8.6*   ANIONGAP 7*   ESTGFRAFRICA >60   EGFRNONAA >60    and CBC No results for input(s): WBC, HGB, HCT, PLT in the last 48 hours.  Radiology: CT scan: CT ABDOMEN PELVIS WITH CONTRAST: No results found for this visit on 06/18/22. and CT ABDOMEN PELVIS WITHOUT CONTRAST:   Results for orders placed or performed during the hospital encounter of 06/18/22   CT Abdomen Pelvis  Without Contrast    Narrative    EXAMINATION:  CT ABDOMEN PELVIS WITHOUT CONTRAST    CLINICAL HISTORY:  Bowel obstruction suspected;. Abdominal pain.  Nausea and vomiting.    TECHNIQUE:  Low dose axial images, sagittal and coronal reformations were obtained from the lung bases to the pubic  symphysis.    COMPARISON:  05/17/2021    FINDINGS:  Minimal atelectasis posterior right lung base.  Dependent atelectasis posterior left lung base.  Visualized heart normal in size.    The liver is normal.  The gallbladder is normal.    Normal spleen.  Normal pancreas.  Mild thickening medial limb left adrenal gland.  Low-density nodule medial limb right adrenal gland with Hounsfield units measuring -4, consistent with an adenoma (13 mm).  No change since prior exam.  Aorta and iliac vasculature normal in caliber with atherosclerotic calcification.    The left kidney is normal.  Left ureter is normal.  Minimal prominence of the right renal pelvis and proximal ureter but no hydronephrosis.  No obstructing lesion.    Stomach is distended with fluid, worse since prior exam.  Within the lower abdomen are new since the prior examination.  Collapse normal caliber ileal loops.  Findings worrisome for early or partial small bowel obstruction.  Normal appendix.  Dilated fluid-filled loops of small bowel the colon is normal.  The rectum is normal.    The pelvis demonstrates distended, normal bladder.  Uterus normal.  No adnexal masses.  Previous right ovarian cyst resolved.    No significant abnormality of the abdominopelvic wall soft tissues.  Grade 2 isthmic spondylolisthesis L4-L5, unchanged since the prior exam.  No suspicious osseous lesions.      Impression    1. Findings worrisome for early small bowel obstruction or partial small bowel obstruction.  New since prior exam 05/17/2021.  See above for details.  2. Previous right ovarian cyst resolved.  All CT scans at this facility are performed  using dose modulation techniques as appropriate to performed exam including the following:  automated exposure control; adjustment of mA and/or kV according to the patients size (this includes techniques or standardized protocols for targeted exams where dose is matched to indication/reason for exam: i.e. extremities or head);   iterative reconstruction technique.      Electronically signed by: Ricardo Shay  Date:    06/18/2022  Time:    07:29     CT head    Narrative & Impression  EXAMINATION:  CT HEAD WITHOUT CONTRAST     CLINICAL HISTORY:  Headache, chronic, new features or increased frequency;     TECHNIQUE:  Contiguous axial CT images were obtained from the skull base through the vertex without intravenous contrast.     COMPARISON:  Prior CT of the head from 10/15/2011 was reviewed.     FINDINGS:  No intracranial hemorrhage. No mass effect or midline shift.  Prominent perivascular spaces within the basal ganglia which are unchanged.  Mild tonsillar ectopia of 6 mm.  Normal parenchymal attenuation. The ventricles and sulci are normal in size and configuration. The paranasal sinuses and mastoid air cells are clear.  No concerning osseous findings.     Impression:     No acute findings.  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  Unchanged mildly prominent perivascular spaces within the basal ganglia.     All CT scans at this facility use dose modulation, iterative reconstruction, and/or weight base dosing when appropriate to reduce radiation dose to as low as reasonably achievable.        Electronically signed by: Georges Holman Jr., MD  Date:                                            06/19/2022    Small-bowel follow-through    Narrative & Impression  EXAMINATION:  XR SMALL BOWEL FOLLOW THROUGH     CLINICAL HISTORY:  SBO;     TECHNIQUE:  Detailed evaluation of the small bowel was performed with spot compression under fluoroscopy, as well as with spot and overhead radiographs.     COMPARISON:  06/18/2022     FINDINGS:  Small bowel caliber and mucosal pattern are normal. No mass, stricture, diverticula, or any intrinsic or extrinsic abnormalities demonstrated. The terminal ileum is normal in appearance.     Small bowel transit time to the colon is within normal limits at 90 minutes.     Fluoroscopic time 0.5  min and 14 fluoroscopic images obtained.     Impression:     Normal small bowel follow-through.        Electronically signed by: Matthew Easley MD  Date:                                            06/20/2022  Time:                                           14:36      Pending Diagnostic Studies:     None        Final Active Diagnoses:    Diagnosis Date Noted POA    PRINCIPAL PROBLEM:  Partial small bowel obstruction [K56.600] 06/18/2022 Yes    Headache [R51.9] 06/19/2022 Yes    Acid reflux [K21.9] 01/30/2020 Yes    Tobacco abuse [Z72.0] 01/30/2020 Yes      Problems Resolved During this Admission:      Discharged Condition: stable    Disposition: Home or Self Care    Follow Up:   Follow-up Information     Shannon Clements MD Follow up in 3 day(s).    Specialty: Family Medicine  Why: hospital follow up  Contact information:  [address]  [address]  [place] [place] [facility] [address]  [phone]             Nixon Jonas MD Follow up.    Specialty: General Surgery  Why: As needed  Contact information:  [address]  [place] [address]  [phone]                       Patient Instructions:      Ambulatory referral/consult to [facility]   Standing Status: Future   Referral Priority: Routine Referral Type: Consultation   Referral Reason: Specialty Services Required   Number of Visits Requested: 1     Diet Cardiac     Notify your health care provider if you experience any of the following:  increased confusion or weakness     Notify your health care provider if you experience any of the following:  persistent dizziness, light-headedness, or visual disturbances     Notify your health care provider if you experience any of the following:  worsening rash     Notify your health care provider if you experience any of the following:  severe persistent headache     Notify your health care provider if you experience any of the following:  difficulty breathing or increased cough      Notify your health care provider if you experience any of the following:  redness, tenderness, or signs of infection (pain, swelling, redness, odor or green/yellow discharge around incision site)     Notify your health care provider if you experience any of the following:  severe uncontrolled pain     Notify your health care provider if you experience any of the following:  persistent nausea and vomiting or diarrhea     Notify your health care provider if you experience any of the following:  temperature >100.4     Notify your health care provider if you experience any of the following:  temperature >100.4     Notify your health care provider if you experience any of the following:  persistent nausea and vomiting or diarrhea     Notify your health care provider if you experience any of the following:   Order Comments: Abdominal pain, abdominal distension, persistent nausea or vomiting     Activity as tolerated     Activity as tolerated     Medications:  Reconciled Home Medications:      Medication List      START taking these medications    metoclopramide HCl 10 MG tablet  Commonly known as: REGLAN  Take 1 tablet (10 mg total) by mouth 4 (four) times daily.        CONTINUE taking these medications    amitriptyline 25 MG tablet  Commonly known as: ELAVIL  Take 1 tablet (25 mg total) by mouth nightly as needed for Insomnia.     aspirin-acetaminophen-caffeine 250-250-65 mg 250-250-65 mg per tablet  Commonly known as: EXCEDRIN MIGRAINE  Take 3 tablets by mouth every 4 to 6 hours as needed for Pain.     cyclobenzaprine 10 MG tablet  Commonly known as: FLEXERIL  TAKE 1/2 TO 1 (ONE-HALF TO ONE) TABLET BY MOUTH TWICE DAILY AS NEEDED FOR MUSCLE SPASM MAY CAUSE DROWSINESS     JULIA-C ORAL  Take 1,000 mg by mouth once daily.     FIBER GUMMIES ORAL  Take 1 Piece by mouth once daily.     gabapentin 300 MG capsule  Commonly known as: NEURONTIN  Take 1 capsule (300 mg total) by mouth 2 (two) times daily as needed (pain).      multivitamin capsule  Take 1 capsule by mouth once daily.     omeprazole 40 MG capsule  Commonly known as: PRILOSEC  Take 1 capsule (40 mg total) by mouth once daily.     polyethylene glycol 17 gram/dose powder  Commonly known as: GLYCOLAX  Take 17 g by mouth nightly.          Time spent on the discharge of patient: 20 minutes    Nixon Jonas MD  General Surgery  '[facility] Surg

## 2022-06-24 NOTE — PHYSICIAN QUERY
PT Name: Rosario Lebron  MR #: [id number]     DOCUMENTATION CLARIFICATION     CDS: Brandy Capley, RN  Email: [email or web address]    This form is a permanent document in the medical record.     Query Date: June 24, 2022    By submitting this query, we are merely seeking further clarification of documentation to reflect the severity of illness of your patient. Please utilize your independent clinical judgment when addressing the question(s) below.    The medical record reflects the following:     Indicators   Supporting Clinical Findings Location in Medical Record   X Bowel obstruction, intestinal obstruction, LBO or SBO documented   PRINCIPAL PROBLEM:  Partial small bowel obstruction      Discharge summary 6/21   X Radiology findings Stomach is distended with fluid, worse since prior exam.  Within the lower abdomen are new since the prior examination.  Collapse normal caliber ileal loops.  Findings worrisome for early or partial small bowel obstruction.  Normal appendix.  Dilated fluid-filled loops of small bowel the colon is normal.  The rectum is normal.   CT 6/19   X Treatment/Medication Will continue NG tube to low intermittent wall suction and bowel rest IVF  Repeat labs in a.m.  Pain and nausea control.  Lovenox for DVT prophylaxis  Serial abdominal exams  Continue Protonix secondary to history of GERD  Nicotine patch secondary to tobacco abuse   H&P 6/18    Procedure/Surgery     X Other history abdominal surgery for her bladder, small bowel obstruction and hernia repair.      Patient has history of multiple small-bowel obstructions.  One of them did require exploratory laparotomy but sounds like it may have been caused from a hernia.  Patient has had multiple problems since a trauma resulted in fractured pelvis and bladder rupture.  Multiple abdominal surgeries including bladder repair, exploratory lap for trauma, exploratory lap for bowel obstruction, hernia repair, bilateral tubal ligation.      06/21/2022.    Mild headache.  Tolerated clear liquids to full liquids after small-bowel follow-through showed no obstructions.  Having bowel movements.  No abdominal pain today.  H&P 6/18                    Discharge summary 6/21     Provider, please further specify the partial bowel obstruction diagnosis:    [   ] Partial or incomplete intestinal obstruction, due to adhesions     [   ] Partial or incomplete intestinal obstruction, due to other (please specify): ____________     [   ] Partial or incomplete intestinal obstruction, unknown or unspecified etiology     [ x  ] Other intestinal condition (please specify): __ CT scans are over sensitive for bowel obstructions.  Small-bowel follow-through showed no evidence of a bowel obstruction ___________________     [   ]   Clinically Undetermined           Please document in your progress notes daily for the duration of treatment until resolved, and include in your discharge summary.

## 2022-06-27 ENCOUNTER — PES CALL (OUTPATIENT)
Dept: ADMINISTRATIVE | Facility: CLINIC | Age: 52
End: 2022-06-27
Payer: OTHER GOVERNMENT

## 2022-06-27 NOTE — PHYSICIAN QUERY
PT Name: Rosario Lebron  MR #: [id number]     DOCUMENTATION CLARIFICATION      CDS: Brandy Capley, RN  Email: [email or web address]    This form is a permanent document in the medical record.     Query Date: June 27, 2022    By submitting this query, we are merely seeking further clarification of documentation.  Please utilize your independent clinical judgment when addressing the question(s) below.     The Medical Record contains the following:    Clinical Information Location in Medical Record     Chief Complaint  Patient presents with   Abdominal Pain      Pt CO diffused abd pain x1 day. CO N/V no D. Pos GI hx     PRINCIPAL PROBLEM:  Partial small bowel obstruction     Provider, please further specify the partial bowel obstruction diagnosis:  [ x  ] Other intestinal condition (please specify): __ CT scans are over sensitive for bowel obstructions.  Small-bowel follow-through showed no evidence of a bowel obstruction      ED provider notes 6/18          Discharge summary 6/21    Physician query filed 6/24   history abdominal surgery for her bladder, small bowel obstruction and hernia repair.       Patient has history of multiple small-bowel obstructions.  One of them did require exploratory laparotomy but sounds like it may have been caused from a hernia.  Patient has had multiple problems since a trauma resulted in fractured pelvis and bladder rupture.  Multiple abdominal surgeries including bladder repair, exploratory lap for trauma, exploratory lap for bowel obstruction, hernia repair, bilateral tubal ligation.      06/21/2022.    Mild headache.  Tolerated clear liquids to full liquids after small-bowel follow-through showed no obstructions.  Having bowel movements.  No abdominal pain today.  H&P 6/18                          Discharge summary 6/21   Will continue NG tube to low intermittent wall suction and bowel rest IVF  Repeat labs in a.m.  Pain and nausea control.  Lovenox for DVT prophylaxis  Serial  abdominal exams  Continue Protonix secondary to history of GERD  Nicotine patch secondary to tobacco abuse    metoclopramide HCl injection 10 mg  Dose: 10 mg  Freq: Every 6 hours Route: IV  Start: 06/20/22 1800 End: 06/21/22 4598    START taking these medications   metoclopramide HCl 10 MG tablet  Commonly known as: REGLAN  Take 1 tablet (10 mg total) by mouth 4 (four) times daily   H&P 6/18                MAR 6/20          Discharge summary 6/21   Stomach is distended with fluid, worse since prior exam.  Within the lower abdomen are new since the prior examination.  Collapse normal caliber ileal loops.  Findings worrisome for early or partial small bowel obstruction.  Normal appendix.  Dilated fluid-filled loops of small bowel the colon is normal.  The rectum is normal.    Nonobstructive bowel gas pattern is noted. No radiopaque kidney calculus is identified. Contrast is seen throughout the large bowel without evidence of stricture or obstruction.  CT 6/19          Abd XR 6/21     Please document your best medical opinion regarding the etiology of __ Abdominal pain, nausea, and vomiting_____?       [   ] Other intestinal condition ruled in and partial bowel obstruction ruled out (please specify other intestinal condition): ___________________________,      [   ] Partial bowel obstruction ruled in (please further specify etiology):  (  ) adhesions  (  ) Other (please specify): ______________  (  ) Clinically undetermined     [  x ] Other etiology (please specify):__ the patient never out of bowel obstruction _________________     [  ]   Clinically Undetermined             Please document in your progress notes daily for the duration of treatment, until resolved, and include in your discharge summary.

## 2022-07-15 ENCOUNTER — CARE AT HOME (OUTPATIENT)
Dept: HOME HEALTH SERVICES | Facility: CLINIC | Age: 52
End: 2022-07-15
Payer: OTHER GOVERNMENT

## 2022-07-15 VITALS
HEART RATE: 80 BPM | OXYGEN SATURATION: 97 % | DIASTOLIC BLOOD PRESSURE: 90 MMHG | TEMPERATURE: 98 F | RESPIRATION RATE: 18 BRPM | SYSTOLIC BLOOD PRESSURE: 140 MMHG

## 2022-07-15 DIAGNOSIS — J45.909 ASTHMA DUE TO SEASONAL ALLERGIES: Primary | ICD-10-CM

## 2022-07-15 DIAGNOSIS — K56.600 PARTIAL SMALL BOWEL OBSTRUCTION: ICD-10-CM

## 2022-07-15 PROCEDURE — 99406 PR TOBACCO USE CESSATION INTERMEDIATE 3-10 MINUTES: ICD-10-PCS | Mod: S$GLB,,, | Performed by: NURSE PRACTITIONER

## 2022-07-15 PROCEDURE — 99350 PR HOME VISIT,ESTAB PATIENT,LEVEL IV: ICD-10-PCS | Mod: 25,,, | Performed by: NURSE PRACTITIONER

## 2022-07-15 PROCEDURE — 99406 BEHAV CHNG SMOKING 3-10 MIN: CPT | Mod: S$GLB,,, | Performed by: NURSE PRACTITIONER

## 2022-07-15 PROCEDURE — 99350 HOME/RES VST EST HIGH MDM 60: CPT | Mod: 25,,, | Performed by: NURSE PRACTITIONER

## 2022-07-15 RX ORDER — LEVOCETIRIZINE DIHYDROCHLORIDE 5 MG/1
5 TABLET, FILM COATED ORAL NIGHTLY
Qty: 30 TABLET | Refills: 11 | Status: SHIPPED | OUTPATIENT
Start: 2022-07-15 | End: 2023-10-20

## 2022-07-15 RX ORDER — FLUTICASONE PROPIONATE 50 MCG
1 SPRAY, SUSPENSION (ML) NASAL DAILY
Qty: 16 G | Refills: 0 | Status: SHIPPED | OUTPATIENT
Start: 2022-07-15 | End: 2022-11-12

## 2022-07-15 RX ORDER — METOCLOPRAMIDE 10 MG/1
10 TABLET ORAL 4 TIMES DAILY
Qty: 120 TABLET | Refills: 1 | Status: SHIPPED | OUTPATIENT
Start: 2022-07-15 | End: 2022-08-14

## 2022-07-16 NOTE — PROGRESS NOTES
[facility]  Transition of Care Home Visit    Visit Date: 7/16/2022  Encounter Provider: Erin Martinez   PCP:  Shannon Clements MD    PRESENTING HISTORY      Patient ID: Rosario Lebron is a 51 y.o. female.    Consult Requested By:  Adelita Williamson  Reason for Consult:  Hospital Follow Up.    Rosario is being seen at home due to being seen at home due to physical debility that presents a taxing effort to leave the home, to mitigate high risk of hospital readmission and/or due to the limited availability of reliable or safe options for transportation to the point of access to the provider. Prior to treatment on this visit the chart was reviewed and patient verbal consent was obtained.      Chief Complaint: Transitional Care    Patient admitted to hospital on 06/18 and discharged to home on 06/21    History of Present Illness: Ms. Rosario Lebron is a 51 y.o. female who was recently admitted to the hospital with history of small-bowel obstruction presenting with crampy abdominal pain that is generalized onset o'clock a.m. yesterday.  She has had a bowel movement yesterday but none today.  Patient does note some nausea vomiting but no diarrhea.  No fevers or chills.  She denies any urinary complaints as well history abdominal surgery for her bladder, small bowel obstruction and hernia repair.  She does smoke.    ___________________________________________________________________    Today:    HPI:  Patient is being seen today for a transitional care visit after hospitalization. See hospital course for details. Upon arrival patient was ambulatory in the home and her  was there during the visit. Patient reports some issues with allergies, runny and stuffy nose. Otherwise patient has no complaints of abdominal pain, nausea or vomiting. VSS and patient reports compliance with all medications.        Review of Systems   Constitutional: Negative for activity change, appetite change, diaphoresis and  fever.   HENT: Positive for rhinorrhea.    Eyes: Negative.    Respiratory: Negative for cough, chest tightness and shortness of breath.    Cardiovascular: Negative.    Gastrointestinal: Negative for abdominal pain, constipation, diarrhea, nausea and vomiting.   Endocrine: Negative.    Genitourinary: Negative.    Musculoskeletal: Positive for back pain.   Skin: Negative.    Hematological: Negative.    Psychiatric/Behavioral: Negative.        Assessments:  · Environmental: Patient lives in a single story home with her family. There are steps at the entrance and lighting is bright and temperature is comfortable  · Functional Status: Patient is ambulatory and independent with ADLs  · Safety: Fall Precautions  · Nutritional: Adequate food in the home  · Home Health/DME/Supplies: No home health, no DMEs    PAST HISTORY:     Past Medical History:   Diagnosis Date    PONV (postoperative nausea and vomiting)     SBO (small bowel obstruction) 6/18/2022       Past Surgical History:   Procedure Laterality Date    BLADDER REPAIR      CARPAL TUNNEL RELEASE Right 4/5/2021    Procedure: RELEASE, CARPAL TUNNEL;  Surgeon: Michael Frank MD;  Location: [facility];  Service: Orthopedics;  Laterality: Right;    COLONOSCOPY N/A 5/19/2021    Procedure: COLONOSCOPY;  Surgeon: Emeka Pritchett MD;  Location: [facility];  Service: General;  Laterality: N/A;    EYE SURGERY Left     HERNIA REPAIR      SMALL INTESTINE SURGERY      TONSILLECTOMY      TUBAL LIGATION      URETHRA SURGERY         Family History   Problem Relation Age of Onset    Prostate cancer Father        Social History     Socioeconomic History    Marital status:    Tobacco Use    Smoking status: Current Every Day Smoker     Packs/day: 1.00     Years: 10.00     Pack years: 10.00     Types: Cigarettes    Smokeless tobacco: Never Used   Substance and Sexual Activity    Alcohol use: Not Currently    Drug use: Never    Sexual activity: Yes      Partners: Male       MEDICATIONS & ALLERGIES:     Current Outpatient Medications on File Prior to Visit   Medication Sig Dispense Refill    amitriptyline (ELAVIL) 25 MG tablet Take 1 tablet (25 mg total) by mouth nightly as needed for Insomnia. 30 tablet 2    ascorbate calcium (JULIA-C ORAL) Take 1,000 mg by mouth once daily.      aspirin-acetaminophen-caffeine 250-250-65 mg (EXCEDRIN MIGRAINE) 250-250-65 mg per tablet Take 3 tablets by mouth every 4 to 6 hours as needed for Pain.      cyclobenzaprine (FLEXERIL) 10 MG tablet TAKE 1/2 TO 1 (ONE-HALF TO ONE) TABLET BY MOUTH TWICE DAILY AS NEEDED FOR MUSCLE SPASM MAY CAUSE DROWSINESS 60 tablet 2    gabapentin (NEURONTIN) 300 MG capsule Take 1 capsule (300 mg total) by mouth 2 (two) times daily as needed (pain). 60 capsule 11    inulin (FIBER GUMMIES ORAL) Take 1 Piece by mouth once daily.      multivitamin capsule Take 1 capsule by mouth once daily.      omeprazole (PRILOSEC) 40 MG capsule Take 1 capsule (40 mg total) by mouth once daily. 30 capsule 6    polyethylene glycol (GLYCOLAX) 17 gram/dose powder Take 17 g by mouth nightly.       Current Facility-Administered Medications on File Prior to Visit   Medication Dose Route Frequency Provider Last Rate Last Admin    lactated ringers infusion   Intravenous On Call Procedure OLIVA Davison        lactated ringers infusion   Intravenous Continuous Maddy Castro MD 10 mL/hr at 04/05/21 0828 New Bag at 04/05/21 0828    nozaseptin (NOZIN) nasal    Each Nostril On Call Procedure OLIVA Davison   Given at 04/05/21 0828        Review of patient's allergies indicates:   Allergen Reactions    Sulfa (sulfonamide antibiotics) Swelling     Lip swelling       OBJECTIVE:     Vital Signs:  Vitals:    07/15/22 0900   BP: (!) 140/90   Pulse: 80   Resp: 18   Temp: 98 °F (36.7 °C)     There is no height or weight on file to calculate BMI.     Physical Exam:  Physical Exam  Vitals reviewed.    Constitutional:       General: She is not in acute distress.     Appearance: Normal appearance.   HENT:      Head: Normocephalic.      Nose: Rhinorrhea present.      Mouth/Throat:      Mouth: Mucous membranes are moist.   Eyes:      Pupils: Pupils are equal, round, and reactive to light.   Cardiovascular:      Rate and Rhythm: Normal rate and regular rhythm.      Heart sounds: Murmur heard.   Pulmonary:      Effort: Pulmonary effort is normal.      Breath sounds: Normal breath sounds.   Abdominal:      General: Bowel sounds are normal. There is no distension.      Palpations: Abdomen is soft.      Tenderness: There is no abdominal tenderness. There is no guarding.   Musculoskeletal:         General: Normal range of motion.      Cervical back: Normal range of motion.   Skin:     General: Skin is warm and dry.      Capillary Refill: Capillary refill takes less than 2 seconds.   Neurological:      General: No focal deficit present.      Mental Status: She is alert and oriented to person, place, and time.   Psychiatric:         Mood and Affect: Mood normal.         Behavior: Behavior normal.         Thought Content: Thought content normal.         Judgment: Judgment normal.         Laboratory  Lab Results   Component Value Date    WBC 7.89 06/19/2022    HGB 12.9 06/19/2022    HCT 40.0 06/19/2022    MCV 97 06/19/2022     06/19/2022     Lab Results   Component Value Date    INR 1.0 03/24/2022    INR 0.9 10/15/2011     No results found for: HGBA1C  No results for input(s): POCTGLUCOSE in the last 72 hours.    Diagnostic Results:      TRANSITION OF CARE:     [facility] Contact Note: 06/27/2022    Family and/or Caretaker present at visit?  Yes.  Diagnostic tests reviewed/disposition: No diagnosic tests pending after this hospitalization.  Disease/illness education: Partial SBO  Home health/community services discussion/referrals: Patient does not have home health established from hospital visit.  They do not  need home health.  If needed, we will set up home health for the patient.   Establishment or re-establishment of referral orders for community resources: No other necessary community resources.   Discussion with other health care providers: No discussion with other health care providers necessary.     Transition of Care Visit:  I have reviewed and updated the history and problem list.  I have reconciled the medication list.  I have discussed the hospitalization and current medical issues, prognosis and plans with the patient/family.  I  spent more than 50% of time discussing the care with the patient/family.  Total Face-to-Face Encounter: 60 minutes.    Medications Reconciliation:   I have reconciled the patient's home medications and discharge medications with the patient/family. I have updated all changes.  Refer to After-Visit Medication List.    ASSESSMENT & PLAN:     HIGH RISK CONDITION(S):  SBO Partial    1. Asthma due to seasonal allergies  Comments:  flonase take as directed  xyzal 5mg po QHS    2. Partial small bowel obstruction  -     Ambulatory referral/consult to [facility]    Other orders  -     fluticasone propionate (FLONASE) 50 mcg/actuation nasal spray  -     metoclopramide HCl (REGLAN) 10 MG tablet  -     levocetirizine (XYZAL) 5 MG tablet    Encounter for Medical Follow-Up and Medication Review  - [facility] to schedule follow-up visit with patient in 2-4 weeks or PRN.     Patient Instructions Given:  - Continue all medications, treatments and therapies as ordered.   - Follow all instructions, recommendations as discussed.  - Maintain Safety Precautions at all times.  - Attend all medical appointments as scheduled.  - For worsening symptoms: call Primary Care Physician or Nurse Practitioner.  - For emergencies, call 911 or immediately report to the nearest emergency room         Were controlled substances prescribed?  No    Instructions for the  patient:    Scheduled Follow-up :  Future Appointments   Date Time Provider Department Center   8/1/2022  9:20 AM Willy Monaco MD ON IN [facility]       After Visit Medication List :     Medication List          Accurate as of July 15, 2022 11:59 PM. If you have any questions, ask your nurse or doctor.            START taking these medications    fluticasone propionate 50 mcg/actuation nasal spray  Commonly known as: FLONASE  1 spray (50 mcg total) by Each Nostril route once daily.  Started by: Erin Martinez NP     levocetirizine 5 MG tablet  Commonly known as: XYZAL  Take 1 tablet (5 mg total) by mouth every evening.  Started by: Erin Martinez NP        CONTINUE taking these medications    amitriptyline 25 MG tablet  Commonly known as: ELAVIL  Take 1 tablet (25 mg total) by mouth nightly as needed for Insomnia.     aspirin-acetaminophen-caffeine 250-250-65 mg 250-250-65 mg per tablet  Commonly known as: EXCEDRIN MIGRAINE     cyclobenzaprine 10 MG tablet  Commonly known as: FLEXERIL  TAKE 1/2 TO 1 (ONE-HALF TO ONE) TABLET BY MOUTH TWICE DAILY AS NEEDED FOR MUSCLE SPASM MAY CAUSE DROWSINESS     JULIA-C ORAL     FIBER GUMMIES ORAL     gabapentin 300 MG capsule  Commonly known as: NEURONTIN  Take 1 capsule (300 mg total) by mouth 2 (two) times daily as needed (pain).     metoclopramide HCl 10 MG tablet  Commonly known as: REGLAN  Take 1 tablet (10 mg total) by mouth 4 (four) times daily.     multivitamin capsule     omeprazole 40 MG capsule  Commonly known as: PRILOSEC  Take 1 capsule (40 mg total) by mouth once daily.     polyethylene glycol 17 gram/dose powder  Commonly known as: GLYCOLAX           Where to Get Your Medications      These medications were sent to [id number]  [place], [place] - [address] [address]  [address] [address], [place] [address]    Phone: [phone]   · fluticasone propionate 50 mcg/actuation nasal spray  · levocetirizine 5 MG tablet  · metoclopramide HCl 10 MG tablet          Signature: Erin Martinez NP

## 2022-08-01 ENCOUNTER — OFFICE VISIT (OUTPATIENT)
Dept: PAIN MEDICINE | Facility: CLINIC | Age: 52
End: 2022-08-01
Payer: OTHER GOVERNMENT

## 2022-08-01 VITALS
RESPIRATION RATE: 17 BRPM | HEART RATE: 89 BPM | HEIGHT: 64 IN | SYSTOLIC BLOOD PRESSURE: 123 MMHG | WEIGHT: 165.13 LBS | BODY MASS INDEX: 28.19 KG/M2 | DIASTOLIC BLOOD PRESSURE: 88 MMHG

## 2022-08-01 DIAGNOSIS — M43.16 SPONDYLOLISTHESIS OF LUMBAR REGION: Primary | ICD-10-CM

## 2022-08-01 DIAGNOSIS — M51.36 DDD (DEGENERATIVE DISC DISEASE), LUMBAR: ICD-10-CM

## 2022-08-01 PROCEDURE — 99999 PR PBB SHADOW E&M-EST. PATIENT-LVL IV: ICD-10-PCS | Mod: PBBFAC,,, | Performed by: PHYSICAL MEDICINE & REHABILITATION

## 2022-08-01 PROCEDURE — 99999 PR PBB SHADOW E&M-EST. PATIENT-LVL IV: CPT | Mod: PBBFAC,,, | Performed by: PHYSICAL MEDICINE & REHABILITATION

## 2022-08-01 PROCEDURE — 99214 OFFICE O/P EST MOD 30 MIN: CPT | Mod: PBBFAC | Performed by: PHYSICAL MEDICINE & REHABILITATION

## 2022-08-01 PROCEDURE — 99213 OFFICE O/P EST LOW 20 MIN: CPT | Mod: S$PBB,,, | Performed by: PHYSICAL MEDICINE & REHABILITATION

## 2022-08-01 PROCEDURE — 99213 PR OFFICE/OUTPT VISIT, EST, LEVL III, 20-29 MIN: ICD-10-PCS | Mod: S$PBB,,, | Performed by: PHYSICAL MEDICINE & REHABILITATION

## 2022-08-01 RX ORDER — CYCLOBENZAPRINE HCL 10 MG
TABLET ORAL
Qty: 60 TABLET | Refills: 2 | Status: SHIPPED | OUTPATIENT
Start: 2022-08-01 | End: 2022-09-08
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
## 2022-10-04 ENCOUNTER — PATIENT MESSAGE (OUTPATIENT)
Dept: ADMINISTRATIVE | Facility: HOSPITAL | Age: 52
End: 2022-10-04
Payer: OTHER GOVERNMENT

## 2023-02-07 ENCOUNTER — PATIENT OUTREACH (OUTPATIENT)
Dept: ADMINISTRATIVE | Facility: HOSPITAL | Age: 53
End: 2023-02-07
Payer: OTHER GOVERNMENT

## 2023-02-07 NOTE — PROGRESS NOTES
Working Mammogram Report:     Pt overdue for mammogram. Called to offer scheduling.  No answer, LVM

## 2023-02-10 ENCOUNTER — PATIENT OUTREACH (OUTPATIENT)
Dept: ADMINISTRATIVE | Facility: HOSPITAL | Age: 53
End: 2023-02-10
Payer: OTHER GOVERNMENT

## 2023-10-20 RX ORDER — LEVOCETIRIZINE DIHYDROCHLORIDE 5 MG/1
5 TABLET, FILM COATED ORAL NIGHTLY
Qty: 30 TABLET | Refills: 0 | Status: SHIPPED | OUTPATIENT
Start: 2023-10-20

## (undated) DEVICE — PAD CAST SPECIALIST STRL 3

## (undated) DEVICE — SCRUB HIBICLENS 4% CHG 4OZ

## (undated) DEVICE — COVER LIGHT HANDLE 80/CA

## (undated) DEVICE — GAUZE SPONGE 4X4 12PLY

## (undated) DEVICE — SYR 10CC LUER LOCK

## (undated) DEVICE — UNDERGLOVES BIOGEL PI SIZE 7.5

## (undated) DEVICE — SEE MEDLINE ITEM 157173

## (undated) DEVICE — SEE MEDLINE ITEM 152522

## (undated) DEVICE — DRESSING XEROFORM FOIL PK 1X8

## (undated) DEVICE — SEE MEDLINE ITEM 157117

## (undated) DEVICE — NDL HYPODERMIC BLUNT 18G 1.5IN

## (undated) DEVICE — SEE MEDLINE ITEM 157131

## (undated) DEVICE — PAD CAST SPECIALIST STRL 4

## (undated) DEVICE — SOL 9P NACL IRR PIC IL

## (undated) DEVICE — APPLICATOR CHLORAPREP ORN 26ML

## (undated) DEVICE — POSITIONER HEAD DONUT 9IN FOAM

## (undated) DEVICE — SUPPORT ULNA NERVE PROTECTOR

## (undated) DEVICE — SEE MEDLINE ITEM 152622

## (undated) DEVICE — BANDAGE ELASTIC 3X5 VELCRO ST

## (undated) DEVICE — UNDERGLOVES BIOGEL PI SIZE 8.5

## (undated) DEVICE — SUT 4-0 ETHILON 18 PS-2

## (undated) DEVICE — GOWN SURG 2XL DISP TIE BACK

## (undated) DEVICE — COVER CAMERA OPERATING ROOM

## (undated) DEVICE — TOURNIQUET SB QC DP 18X4IN

## (undated) DEVICE — NDL SAFETY 25G X 1.5 ECLIPSE

## (undated) DEVICE — GLOVE BIOGEL SZ 8 1/2

## (undated) DEVICE — ALCOHOL 70% ISOP RUBBING 4OZ

## (undated) DEVICE — DRAPE PLASTIC U 60X72

## (undated) DEVICE — SEE MEDLINE ITEM 157027

## (undated) DEVICE — PAD ABD 8X10 STERILE